# Patient Record
Sex: FEMALE | Race: WHITE | Employment: PART TIME | URBAN - METROPOLITAN AREA
[De-identification: names, ages, dates, MRNs, and addresses within clinical notes are randomized per-mention and may not be internally consistent; named-entity substitution may affect disease eponyms.]

---

## 2017-06-06 ENCOUNTER — TELEPHONE (OUTPATIENT)
Dept: FAMILY MEDICINE | Facility: CLINIC | Age: 52
End: 2017-06-06

## 2017-06-06 NOTE — TELEPHONE ENCOUNTER
Panel Management Review      Patient has the following on her problem list: None      Composite cancer screening  Chart review shows that this patient is due/due soon for the following Mammogram and Colonoscopy  Summary:    Patient is due/failing the following:   COLONOSCOPY and MAMMOGRAM    Action needed:   Patient needs referral/order: colonoscopy and mammogram     Type of outreach:    Sent "[employer]t message.    Questions for provider review:    None                                                                                                                                    db     Chart routed to  .

## 2017-08-05 ENCOUNTER — TELEPHONE (OUTPATIENT)
Dept: INTERNAL MEDICINE | Facility: CLINIC | Age: 52
End: 2017-08-05

## 2017-08-05 NOTE — TELEPHONE ENCOUNTER
8/5/2017    Attempt 1    Contacted patient in regards to scheduling VIP mammogram  Message on voicemail     Patient is also due for - Preventive Health Screening Colonoscopy    Comments:       Outreach   CC

## 2017-08-11 NOTE — TELEPHONE ENCOUNTER
8/10/2017    Attempt 2    Contacted patient in regards to scheduling VIP mammogram  Message on voicemail     Patient is also due for - Preventive Health Screening Colonoscopy    Comments:       Outreach   CC

## 2017-08-17 NOTE — TELEPHONE ENCOUNTER
8/17/2017    Call Regarding VIP MAMMOGRAM    Attempt 3    Message on voicemail     Comments:           Outreach   AT

## 2018-01-15 ENCOUNTER — TELEPHONE (OUTPATIENT)
Dept: FAMILY MEDICINE | Facility: CLINIC | Age: 53
End: 2018-01-15

## 2018-01-15 NOTE — TELEPHONE ENCOUNTER
Panel Management Review      Patient has the following on her problem list: None      Composite cancer screening  Chart review shows that this patient is due/due soon for the following Mammogram and Colonoscopy  Summary:    Patient is due/failing the following:   COLONOSCOPY and MAMMOGRAM    Action needed:   Patient needs referral/order: mammogram and colonoscopy    Type of outreach:    Sent [employer] message.    Questions for provider review:    None                                                                                                                                    db     Chart routed to  .

## 2018-01-21 ENCOUNTER — OFFICE VISIT (OUTPATIENT)
Dept: URGENT CARE | Facility: URGENT CARE | Age: 53
End: 2018-01-21
Payer: COMMERCIAL

## 2018-01-21 VITALS
BODY MASS INDEX: 21.93 KG/M2 | WEIGHT: 140 LBS | TEMPERATURE: 98 F | HEART RATE: 65 BPM | SYSTOLIC BLOOD PRESSURE: 106 MMHG | OXYGEN SATURATION: 98 % | RESPIRATION RATE: 16 BRPM | DIASTOLIC BLOOD PRESSURE: 72 MMHG

## 2018-01-21 DIAGNOSIS — L01.00 IMPETIGO: Primary | ICD-10-CM

## 2018-01-21 PROCEDURE — 99213 OFFICE O/P EST LOW 20 MIN: CPT | Performed by: FAMILY MEDICINE

## 2018-01-21 RX ORDER — MUPIROCIN 20 MG/G
OINTMENT TOPICAL 3 TIMES DAILY
Qty: 22 G | Refills: 1 | Status: SHIPPED | OUTPATIENT
Start: 2018-01-21 | End: 2018-01-28

## 2018-01-21 NOTE — PATIENT INSTRUCTIONS
Understanding Impetigo  Impetigo is a common bacterial infection of the skin. It most often affects the face, arms, and legs. But it can appear on any part of the body. Anyone can have it, regardless of age. But it is most common in children. Impetigo is very contagious. This means it spreads easily to other people.  How to say it  [id number]   What causes impetigo?  Many types of bacteria live on normal, healthy skin. The bacteria usually don t cause problems. Impetigo happens when bacteria enter the skin through a scratch, break, sore, bite, or irritated spot. They then begin to grow out of control, leading to infection. There are two types of staphylococcus bacteria that cause impetigo. In certain cases, impetigo appears on skin that has no visible break. It may be more likely to occur on skin that has another skin problem, such as eczema. It may also be more common after a cold or other virus.  Symptoms of impetigo  Symptoms of this problem include:    Small, fluid-filled blisters on the skin that may itch, ooze, or crust    A yellow, honey-colored crust on the infected skin    Skin sores that spread with scratching    An itchy rash that spreads with scratching    Swollen lymph nodes  Treatment for impetigo  The goal is to treat the infection and prevent it from spreading to others.    You will likely be given an antibiotic to treat the infection. This may be a cream or ointment called muporicin to put on your skin. If the infection is severe or spreading, you may be given antibiotic medicine to take by mouth. Be sure to use this medicine as directed. Do not stop using it until you are told to stop, even if your skin gets better. If you stop too soon, the infection may come back and be harder to treat.    Avoid scratching or picking at your sores. It may help to cover affected areas with a bandage.    To prevent spreading the infection, wash your hands often. Avoid sharing personal items, towels, clothes,  pillows, and sheets with others. After each use, wash these items in hot water.    Clean the affected skin several times a day. Don t scrub. Instead, soak the area in warm, soapy water. This will help remove the crust that forms. For places that you can't soak, such as the face, place a clean, warm (not hot) washcloth on the affected area. Use a new washcloth and towel each time.  When to call your healthcare provider  Call your healthcare provider right away if you have any of these:    Fever of 100.4 F (38 C) or higher, or as directed    Increasing number of sores or spreading areas of redness after 2 days of treatment with antibiotics    Increasing swelling or pain    Increased amounts of fluid or pus coming from the sores    Unusual drowsiness, weakness, or change in behavior    Loss of appetite or vomiting   Date Last Reviewed: 5/1/2016 2000-2017 [employer]. [address] [place] [address], [place] [address]. All rights reserved. This information is not intended as a substitute for professional medical care. Always follow your healthcare professional's instructions.

## 2018-01-21 NOTE — NURSING NOTE
"Chief Complaint   Patient presents with     Urgent Care     Derm Problem     rash on face started yesterday, having burning feeling       Initial /72  Pulse 65  Temp 98  F (36.7  C) (Oral)  Resp 16  Wt 140 lb (63.5 kg)  SpO2 98%  BMI 21.93 kg/m2 Estimated body mass index is 21.93 kg/(m^2) as calculated from the following:    Height as of 9/16/16: 5' 7\" (1.702 m).    Weight as of this encounter: 140 lb (63.5 kg).  Medication Reconciliation: incomplete       Anais Saba CMA      "

## 2018-01-21 NOTE — PROGRESS NOTES
SUBJECTIVE:  Chief Complaint   Patient presents with     Urgent Care     Derm Problem     rash on face started yesterday, having burning feeling       Elba ERIC Jeffries is a 52 year old female who presents to the clinic today for a rash.  Onset of rash was 1 day(s) ago.   Rash is sudden onset and still present.    Location of the rash:   Face / ;eft side of the chin  Quality/symptoms of rash: burning and red   Symptoms are mild and rash seems to be worsening.  Previous history of a similar rash? No  Recent exposure history: none known    Associated symptoms include: nothing.  Patient denies: throat tightness, wheezing, cough, tongue/lip swelling and shortness of breath.    Past Medical History:   Diagnosis Date     Amenorrhea 2008    secondary to endometrial ablation      Menometrorrhagia     resolved s/p endometrial ablation      PMS      PONV (postoperative nausea and vomiting)        ALLERGIES:  Review of patient's allergies indicates no known allergies.      Current Outpatient Prescriptions on File Prior to Visit:  HYDROcodone-acetaminophen (NORCO) 5-325 MG per tablet Take 1-2 tablets by mouth every 4 hours as needed for other (Moderate to Severe Pain)   ZOLOFT 50 MG OR TABS 1 TAB PO QD (Once per day)     No current facility-administered medications on file prior to visit.     Social History   Substance Use Topics     Smoking status: Never Smoker     Smokeless tobacco: Never Used     Alcohol use Yes      Comment: occasional       Family History   Problem Relation Age of Onset     C.A.D. Father      dx late 50s,  70.      Cardiovascular Father      hyperlipidemia     Hypertension Mother      CEREBROVASCULAR DISEASE Mother      TIAs     GASTROINTESTINAL DISEASE Mother      Crohns         ROS:  EYES: NEGATIVE for vision changes or irritation  RESP:NEGATIVE for significant cough or SOB  GI: NEGATIVE for nausea, abdominal pain,      EXAM:   /72  Pulse 65  Temp 98  F (36.7  C) (Oral)  Resp 16  Wt 140 lb  (63.5 kg)  SpO2 98%  BMI 21.93 kg/m2  GENERAL: alert, no acute distress.  SKIN: Rash description:    Distribution: localized  Location: face / left side of the chin  Size  1 cm. X 1 cm.  Color: honey crust and little red,  Lesion type:  Patch,  honey colored crusting ( little)  GENERAL APPEARANCE: healthy, alert and no distress  EYES: EOMI,  PERRL, conjunctiva clear  NEURO: Normal strength and tone, sensory exam grossly normal,  normal speech and mentation    ASSESSMENT:  Impetigo     - mupirocin (BACTROBAN) 2 % ointment; Apply topically 3 times daily for 7 days        Follow-up with primary clinic if not improving with spreading infection

## 2018-01-21 NOTE — MR AVS SNAPSHOT
After Visit Summary   1/21/2018    Elba Jeffries    MRN: [id number]           Patient Information     Date Of Birth          1965        Visit Information        Provider Department      1/21/2018 3:00 PM Caroline Larsen MD [facility] URGENT [facility]ARE        Today's Diagnoses     Impetigo    -  1      Care Instructions      Understanding Impetigo  Impetigo is a common bacterial infection of the skin. It most often affects the face, arms, and legs. But it can appear on any part of the body. Anyone can have it, regardless of age. But it is most common in children. Impetigo is very contagious. This means it spreads easily to other people.  How to say it  [id number]   What causes impetigo?  Many types of bacteria live on normal, healthy skin. The bacteria usually don t cause problems. Impetigo happens when bacteria enter the skin through a scratch, break, sore, bite, or irritated spot. They then begin to grow out of control, leading to infection. There are two types of staphylococcus bacteria that cause impetigo. In certain cases, impetigo appears on skin that has no visible break. It may be more likely to occur on skin that has another skin problem, such as eczema. It may also be more common after a cold or other virus.  Symptoms of impetigo  Symptoms of this problem include:    Small, fluid-filled blisters on the skin that may itch, ooze, or crust    A yellow, honey-colored crust on the infected skin    Skin sores that spread with scratching    An itchy rash that spreads with scratching    Swollen lymph nodes  Treatment for impetigo  The goal is to treat the infection and prevent it from spreading to others.    You will likely be given an antibiotic to treat the infection. This may be a cream or ointment called muporicin to put on your skin. If the infection is severe or spreading, you may be given antibiotic medicine to take by mouth. Be sure to use this medicine as directed. Do not stop  using it until you are told to stop, even if your skin gets better. If you stop too soon, the infection may come back and be harder to treat.    Avoid scratching or picking at your sores. It may help to cover affected areas with a bandage.    To prevent spreading the infection, wash your hands often. Avoid sharing personal items, towels, clothes, pillows, and sheets with others. After each use, wash these items in hot water.    Clean the affected skin several times a day. Don t scrub. Instead, soak the area in warm, soapy water. This will help remove the crust that forms. For places that you can't soak, such as the face, place a clean, warm (not hot) washcloth on the affected area. Use a new washcloth and towel each time.  When to call your healthcare provider  Call your healthcare provider right away if you have any of these:    Fever of 100.4 F (38 C) or higher, or as directed    Increasing number of sores or spreading areas of redness after 2 days of treatment with antibiotics    Increasing swelling or pain    Increased amounts of fluid or pus coming from the sores    Unusual drowsiness, weakness, or change in behavior    Loss of appetite or vomiting   Date Last Reviewed: 5/1/2016 2000-2017 [employer]. [address] [place] [address], [place] [address]. All rights reserved. This information is not intended as a substitute for professional medical care. Always follow your healthcare professional's instructions.                Follow-ups after your visit        Who to contact     If you have questions or need follow up information about today's clinic visit or your schedule please contact [facility] URGENT [facility] directly at [phone].  Normal or non-critical lab and imaging results will be communicated to you by MyChart, letter or phone within 4 business days after the clinic has received the results. If you do not hear from us within 7 days, please contact the clinic through MyChart or phone. If  you have a critical or abnormal lab result, we will notify you by phone as soon as possible.  Submit refill requests through [employer] or call your pharmacy and they will forward the refill request to us. Please allow 3 business days for your refill to be completed.          Additional Information About Your Visit        [employer] Information     [employer] gives you secure access to your electronic health record. If you see a primary care provider, you can also send messages to your care team and make appointments. If you have questions, please call your primary care clinic.  If you do not have a primary care provider, please call [phone] and they will assist you.        Care EveryWhere ID     This is your Care EveryWhere ID. This could be used by other organizations to access your [facility] medical records  [id number]        Your Vitals Were     Pulse Temperature Respirations Pulse Oximetry BMI (Body Mass Index)       65 98  F (36.7  C) (Oral) 16 98% 21.93 kg/m2        Blood Pressure from Last 3 Encounters:   01/21/18 106/72   09/16/16 108/75   09/06/16 103/78    Weight from Last 3 Encounters:   01/21/18 140 lb (63.5 kg)   09/16/16 140 lb 3.2 oz (63.6 kg)   09/06/16 141 lb 1.6 oz (64 kg)              Today, you had the following     No orders found for display         Today's Medication Changes          These changes are accurate as of: 1/21/18  3:11 PM.  If you have any questions, ask your nurse or doctor.               Start taking these medicines.        Dose/Directions    mupirocin 2 % ointment   Commonly known as:  BACTROBAN   Used for:  Impetigo   Started by:  Caroline Larsen MD        Apply topically 3 times daily for 7 days   Quantity:  22 g   Refills:  1            Where to get your medicines      These medications were sent to [facility] [address] [address] [address] [facility] [address] [address] [facility], [facility] [address]     Phone:  [phone]     mupirocin 2 %  ointment                Primary Care Provider Office Phone # Fax #    Abhay Marie [phone]       [address] [place] [phone]        Equal Access to Services     JEREMY HARRIS : Jennyfer maryse mcgee mikaelao Sodenaali, waaxda luqadaha, qaybta kaalmada adeegyada, tacho ivory laChrisjuanjo fine. So [facility] [phone].    ATENCIÓN: Si habla español, tiene a grant disposición servicios gratuitos de asistencia lingüística. Llame al [phone].    We comply with applicable federal civil rights laws and [place] laws. We do not discriminate on the basis of race, color, national origin, age, disability, sex, sexual orientation, or gender identity.            Thank you!     Thank you for choosing [facility] URG[facility]  for your care. Our goal is always to provide you with excellent care. Hearing back from our patients is one way we can continue to improve our services. Please take a few minutes to complete the written survey that you may receive in the mail after your visit with us. Thank you!             Your Updated Medication List - Protect others around you: Learn how to safely use, store and throw away your medicines at www.disposemymeds.org.          This list is accurate as of: 1/21/18  3:11 PM.  Always use your most recent med list.                   Brand Name Dispense Instructions for use Diagnosis    HYDROcodone-acetaminophen 5-325 MG per tablet    NORCO    30 tablet    Take 1-2 tablets by mouth every 4 hours as needed for other (Moderate to Severe Pain)    Female stress incontinence       mupirocin 2 % ointment    BACTROBAN    22 g    Apply topically 3 times daily for 7 days    Impetigo       ZOLOFT 50 MG tablet   Generic drug:  sertraline     30    1 TAB PO QD (Once per day)

## 2018-05-15 ENCOUNTER — HOSPITAL ENCOUNTER (OUTPATIENT)
Dept: MAMMOGRAPHY | Facility: CLINIC | Age: 53
Discharge: HOME | End: 2018-05-15
Attending: OBSTETRICS & GYNECOLOGY | Admitting: OBSTETRICS & GYNECOLOGY
Payer: COMMERCIAL

## 2018-05-15 DIAGNOSIS — Z12.31 VISIT FOR SCREENING MAMMOGRAM: ICD-10-CM

## 2018-05-15 PROCEDURE — 77067 SCR MAMMO BI INCL CAD: CPT

## 2018-07-30 ENCOUNTER — HOSPITAL ENCOUNTER (EMERGENCY)
Facility: CLINIC | Age: 53
Discharge: HOME | End: 2018-07-30
Attending: EMERGENCY MEDICINE | Admitting: EMERGENCY MEDICINE
Payer: COMMERCIAL

## 2018-07-30 ENCOUNTER — APPOINTMENT (OUTPATIENT)
Dept: CT IMAGING | Facility: CLINIC | Age: 53
End: 2018-07-30
Attending: PHYSICIAN ASSISTANT
Payer: COMMERCIAL

## 2018-07-30 VITALS
DIASTOLIC BLOOD PRESSURE: 77 MMHG | HEART RATE: 72 BPM | BODY MASS INDEX: 20.05 KG/M2 | TEMPERATURE: 98 F | WEIGHT: 128 LBS | RESPIRATION RATE: 18 BRPM | OXYGEN SATURATION: 96 % | SYSTOLIC BLOOD PRESSURE: 121 MMHG

## 2018-07-30 DIAGNOSIS — N13.39 OTHER HYDRONEPHROSIS: ICD-10-CM

## 2018-07-30 DIAGNOSIS — N83.201 CYST OF RIGHT OVARY: ICD-10-CM

## 2018-07-30 DIAGNOSIS — N20.1 CALCULUS OF URETER: ICD-10-CM

## 2018-07-30 DIAGNOSIS — K86.2 CYST OF PANCREAS: ICD-10-CM

## 2018-07-30 LAB
ALBUMIN UR-MCNC: NEGATIVE MG/DL
AMORPH CRY #/AREA URNS HPF: ABNORMAL /HPF
ANION GAP SERPL CALCULATED.3IONS-SCNC: 10 MMOL/L (ref 3–14)
APPEARANCE UR: CLEAR
BASOPHILS # BLD AUTO: 0 10E9/L (ref 0–0.2)
BASOPHILS NFR BLD AUTO: 0.7 %
BILIRUB UR QL STRIP: NEGATIVE
BUN SERPL-MCNC: 15 MG/DL (ref 7–30)
CALCIUM SERPL-MCNC: 9.2 MG/DL (ref 8.5–10.1)
CHLORIDE SERPL-SCNC: 104 MMOL/L (ref 94–109)
CO2 SERPL-SCNC: 26 MMOL/L (ref 20–32)
COLOR UR AUTO: YELLOW
CREAT SERPL-MCNC: 0.74 MG/DL (ref 0.52–1.04)
DIFFERENTIAL METHOD BLD: NORMAL
EOSINOPHIL # BLD AUTO: 0.2 10E9/L (ref 0–0.7)
EOSINOPHIL NFR BLD AUTO: 4.2 %
ERYTHROCYTE [DISTWIDTH] IN BLOOD BY AUTOMATED COUNT: 13.2 % (ref 10–15)
GFR SERPL CREATININE-BSD FRML MDRD: 82 ML/MIN/1.7M2
GLUCOSE SERPL-MCNC: 119 MG/DL (ref 70–99)
GLUCOSE UR STRIP-MCNC: NEGATIVE MG/DL
HCT VFR BLD AUTO: 42 % (ref 35–47)
HGB BLD-MCNC: 14 G/DL (ref 11.7–15.7)
HGB UR QL STRIP: ABNORMAL
IMM GRANULOCYTES # BLD: 0 10E9/L (ref 0–0.4)
IMM GRANULOCYTES NFR BLD: 0.2 %
KETONES UR STRIP-MCNC: 5 MG/DL
LEUKOCYTE ESTERASE UR QL STRIP: NEGATIVE
LYMPHOCYTES # BLD AUTO: 1.4 10E9/L (ref 0.8–5.3)
LYMPHOCYTES NFR BLD AUTO: 30.2 %
MCH RBC QN AUTO: 31.7 PG (ref 26.5–33)
MCHC RBC AUTO-ENTMCNC: 33.3 G/DL (ref 31.5–36.5)
MCV RBC AUTO: 95 FL (ref 78–100)
MONOCYTES # BLD AUTO: 0.5 10E9/L (ref 0–1.3)
MONOCYTES NFR BLD AUTO: 10.8 %
MUCOUS THREADS #/AREA URNS LPF: PRESENT /LPF
NEUTROPHILS # BLD AUTO: 2.4 10E9/L (ref 1.6–8.3)
NEUTROPHILS NFR BLD AUTO: 53.9 %
NITRATE UR QL: NEGATIVE
NRBC # BLD AUTO: 0 10*3/UL
NRBC BLD AUTO-RTO: 0 /100
PH UR STRIP: 8 PH (ref 5–7)
PLATELET # BLD AUTO: 241 10E9/L (ref 150–450)
POTASSIUM SERPL-SCNC: 3.6 MMOL/L (ref 3.4–5.3)
RBC # BLD AUTO: 4.42 10E12/L (ref 3.8–5.2)
RBC #/AREA URNS AUTO: 2 /HPF (ref 0–2)
SODIUM SERPL-SCNC: 140 MMOL/L (ref 133–144)
SOURCE: ABNORMAL
SP GR UR STRIP: 1.02 (ref 1–1.03)
UROBILINOGEN UR STRIP-MCNC: 0 MG/DL (ref 0–2)
WBC # BLD AUTO: 4.5 10E9/L (ref 4–11)
WBC #/AREA URNS AUTO: 2 /HPF (ref 0–5)

## 2018-07-30 PROCEDURE — 74177 CT ABD & PELVIS W/CONTRAST: CPT

## 2018-07-30 PROCEDURE — 96361 HYDRATE IV INFUSION ADD-ON: CPT

## 2018-07-30 PROCEDURE — 96376 TX/PRO/DX INJ SAME DRUG ADON: CPT

## 2018-07-30 PROCEDURE — 96375 TX/PRO/DX INJ NEW DRUG ADDON: CPT

## 2018-07-30 PROCEDURE — 96374 THER/PROPH/DIAG INJ IV PUSH: CPT | Mod: 59

## 2018-07-30 PROCEDURE — 81001 URINALYSIS AUTO W/SCOPE: CPT | Performed by: PHYSICIAN ASSISTANT

## 2018-07-30 PROCEDURE — 25000128 H RX IP 250 OP 636: Performed by: EMERGENCY MEDICINE

## 2018-07-30 PROCEDURE — 85025 COMPLETE CBC W/AUTO DIFF WBC: CPT | Performed by: PHYSICIAN ASSISTANT

## 2018-07-30 PROCEDURE — 80048 BASIC METABOLIC PNL TOTAL CA: CPT | Performed by: PHYSICIAN ASSISTANT

## 2018-07-30 PROCEDURE — 25000128 H RX IP 250 OP 636: Performed by: PHYSICIAN ASSISTANT

## 2018-07-30 PROCEDURE — 99285 EMERGENCY DEPT VISIT HI MDM: CPT | Mod: 25

## 2018-07-30 RX ORDER — IOPAMIDOL 755 MG/ML
500 INJECTION, SOLUTION INTRAVASCULAR ONCE
Status: COMPLETED | OUTPATIENT
Start: 2018-07-30 | End: 2018-07-30

## 2018-07-30 RX ORDER — OXYCODONE AND ACETAMINOPHEN 5; 325 MG/1; MG/1
1-2 TABLET ORAL EVERY 4 HOURS PRN
Qty: 12 TABLET | Refills: 0 | Status: SHIPPED | OUTPATIENT
Start: 2018-07-30 | End: 2018-10-10

## 2018-07-30 RX ORDER — SODIUM CHLORIDE 9 MG/ML
1000 INJECTION, SOLUTION INTRAVENOUS CONTINUOUS
Status: DISCONTINUED | OUTPATIENT
Start: 2018-07-30 | End: 2018-07-30 | Stop reason: HOSPADM

## 2018-07-30 RX ORDER — HYDROMORPHONE HYDROCHLORIDE 1 MG/ML
0.5 INJECTION, SOLUTION INTRAMUSCULAR; INTRAVENOUS; SUBCUTANEOUS ONCE
Status: COMPLETED | OUTPATIENT
Start: 2018-07-30 | End: 2018-07-30

## 2018-07-30 RX ORDER — TAMSULOSIN HYDROCHLORIDE 0.4 MG/1
0.4 CAPSULE ORAL DAILY
Qty: 7 CAPSULE | Refills: 0 | Status: SHIPPED | OUTPATIENT
Start: 2018-07-30 | End: 2018-08-06

## 2018-07-30 RX ORDER — HYDROMORPHONE HYDROCHLORIDE 1 MG/ML
0.5 INJECTION, SOLUTION INTRAMUSCULAR; INTRAVENOUS; SUBCUTANEOUS
Status: DISCONTINUED | OUTPATIENT
Start: 2018-07-30 | End: 2018-07-30 | Stop reason: HOSPADM

## 2018-07-30 RX ORDER — ONDANSETRON 4 MG/1
4 TABLET, ORALLY DISINTEGRATING ORAL EVERY 6 HOURS PRN
Qty: 20 TABLET | Refills: 0 | Status: ON HOLD | OUTPATIENT
Start: 2018-07-30 | End: 2018-10-23

## 2018-07-30 RX ORDER — ONDANSETRON 2 MG/ML
4 INJECTION INTRAMUSCULAR; INTRAVENOUS EVERY 30 MIN PRN
Status: DISCONTINUED | OUTPATIENT
Start: 2018-07-30 | End: 2018-07-30 | Stop reason: HOSPADM

## 2018-07-30 RX ORDER — KETOROLAC TROMETHAMINE 15 MG/ML
15 INJECTION, SOLUTION INTRAMUSCULAR; INTRAVENOUS ONCE
Status: COMPLETED | OUTPATIENT
Start: 2018-07-30 | End: 2018-07-30

## 2018-07-30 RX ADMIN — KETOROLAC TROMETHAMINE 15 MG: 15 INJECTION, SOLUTION INTRAMUSCULAR; INTRAVENOUS at 10:36

## 2018-07-30 RX ADMIN — SODIUM CHLORIDE 1000 ML: 9 INJECTION, SOLUTION INTRAVENOUS at 09:26

## 2018-07-30 RX ADMIN — ONDANSETRON 4 MG: 2 INJECTION, SOLUTION INTRAMUSCULAR; INTRAVENOUS at 09:26

## 2018-07-30 RX ADMIN — SODIUM CHLORIDE 56 ML: 900 INJECTION, SOLUTION INTRAVENOUS at 10:05

## 2018-07-30 RX ADMIN — Medication 0.5 MG: at 10:17

## 2018-07-30 RX ADMIN — Medication 0.5 MG: at 09:26

## 2018-07-30 RX ADMIN — IOPAMIDOL 64 ML: 755 INJECTION, SOLUTION INTRAVENOUS at 10:05

## 2018-07-30 ASSESSMENT — ENCOUNTER SYMPTOMS
CONSTIPATION: 0
ABDOMINAL PAIN: 1
VOMITING: 0
NAUSEA: 1
FLANK PAIN: 0
DYSURIA: 0
FEVER: 0
DIARRHEA: 1
RECTAL PAIN: 0
BLOOD IN STOOL: 0
HEMATURIA: 0

## 2018-07-30 NOTE — ED TRIAGE NOTES
Pt presents to ED with c/o RLQ abdominal pain that wraps into her back. Came on suddenly, waking her from sleep this morning around 630. Denies having pain like this before. Reports nausea, diarrhea and lightheadedness. Denies bloody/dark stools.

## 2018-07-30 NOTE — ED PROVIDER NOTES
History     Chief Complaint:  Abdominal Pain    HPI   Elba Jeffries is a 52 year old postmenopausal female with history of urinary stress incontinence and bladder mesh, who presents with right lower quadrant abdominal pain which began when she woke up this morning.  Patient states that approximately 3 hours prior she was awakened from sleep with severe right lower quadrant abdominal pain.  Also reports that she has been nauseous and has had several episodes of diarrhea, but denies vomiting, melena, hematochezia.  She does have a distant history of kidney stones in the past, though reports that this feels somewhat different and she denies any dysuria or hematuria.  No vaginal bleeding.  She has not taken anything for her symptoms yet.  No other prior abdominal surgeries.  Denies recent antibiotic use or travel outside the [place].    Allergies:  No known drug allergies    Medications:    Excedrin  Zoloft    Past Medical History:    Amenorrhea  Menometrorrhagia  PMS  Postoperative nausea and vomiting    Past Surgical History:    Cystoscopy, sling transvaginal  Sinus surgery  Hysterectomy, surgical with endometrial ablation     Family History:    family history includes C.A.D. in her father; Cardiovascular in her father; Cerebrovascular Disease in her mother; GASTROINTESTINAL DISEASE in her mother; Hypertension in her mother.  Social History:   reports that she has never smoked. She has never used smokeless tobacco. She reports that she drinks alcohol. She reports that she does not use illicit drugs.    PCP: Abhay Marie     Review of Systems   Constitutional: Negative for fever.   Gastrointestinal: Positive for abdominal pain, diarrhea and nausea. Negative for blood in stool, constipation, rectal pain and vomiting.   Genitourinary: Negative for dysuria, flank pain, hematuria and vaginal bleeding.   All other systems reviewed and are negative.    Physical Exam     Patient Vitals for the past 24 hrs:   BP  Temp Pulse Heart Rate Resp SpO2 Weight   07/30/18 1130 121/77 - - - - 96 % -   07/30/18 1115 121/75 - - - - 97 % -   07/30/18 1100 126/79 - - - - - -   07/30/18 1045 127/78 - - - - 100 % -   07/30/18 1030 125/82 - - - - 100 % -   07/30/18 0945 127/73 - - - - 100 % -   07/30/18 0930 133/79 - - - - 100 % -   07/30/18 0915 131/71 - - - - 98 % -   07/30/18 0905 134/86 98  F (36.7  C) 72 72 18 98 % 58.1 kg (128 lb)      Physical Exam   General: Alert and cooperative with exam.  Patient uncomfortable appearing.  Normal mentation.  Head:  Scalp is NC/AT  Eyes:  No scleral icterus, PERRL  ENT:  The external nose and ears are normal.  CV:  Regular rate and rhythm    No pathologic murmur, rubs, or gallops.  Resp:  Breath sounds are clear bilaterally.  No crackles, wheezes, rhonchi.    Non-labored, no retractions or accessory muscle use  GI:  Abdomen is soft, no distension, tenderness to palpation in right lower quadrant.  No other significant tenderness to palpation.  Bowel sounds present in all quadrants.. No peritoneal signs.  :  No suprapubic or flank tenderness  MS:  No lower extremity edema   Skin:  Warm and dry, No rash or lesions noted.  Neuro: Oriented x 3. No gross motor deficits.    Emergency Department Course     Imaging:  CT Abdomen Pelvis w Contrast   Preliminary Result   IMPRESSION:    1. Severe right hydronephrosis and hydroureter ureter. 0.3 cm   calcification consistent with right ureterovesical junction stone.   There is no right perinephric stranding.   2. 0.2 cm nonobstructing mid right kidney stone.   3. 3.5 cm right ovarian cyst.   4. Mild pancreatic duct dilatation and nonspecific 0.5 x 0.9 cm cystic   lesion in the uncinate process of the pancreas. Cause of the duct   dilatation and cystic pancreatic lesion are not identified on this   exam but differential diagnosis includes cystic pancreatic neoplasm or   early IPMN. Comparison with any previous outside CT or upper abdominal   imaging is  recommended as well as follow-up with pancreas MRI.           Laboratory:  Labs Ordered and Resulted from Time of ED Arrival Up to the Time of Departure from the ED   BASIC METABOLIC PANEL - Abnormal; Notable for the following:        Result Value    Glucose 119 (*)     All other components within normal limits   ROUTINE UA WITH MICROSCOPIC - Abnormal; Notable for the following:     Ketones Urine 5 (*)     Blood Urine Small (*)     pH Urine 8.0 (*)     Mucous Urine Present (*)     Amorphous Crystals Few (*)     All other components within normal limits   CBC WITH PLATELETS DIFFERENTIAL   FREE WATER   Results otherwise within normal limits.  All results discussed with patient and questions answered.    Interventions:  0926: Zofran 4 mg IV  0926: NS 1L IV Bolus  0926: Dilaudid 0.5 IV  1017: Dilaudid 0.5 IV  1036: Toradol 15 mg IV     Emergency Department Course:  Past medical records, nursing notes, and vitals reviewed.  I performed an exam of the patient and obtained history, as documented above.  I rechecked the patient. Findings and plan explained to the Patient and . Patient was discharged to home.    Impression & Plan      Medical Decision Making:  Elba Jeffries is a 52 year old female who presents with right lower quadrant abdominal pain and nausea which began this morning.  Patient history and records reviewed.  Broad differential was considered.  On examination, the patient is vitally stable and has tenderness to palpation in her right lower quadrant.  Lab work was obtained as above and unremarkable.  CT scan of abdomen and pelvis revealed 3 mm right ureterovesicular stone with accompanying severe right hydronephrosis.  Urinalysis consistent with blood, but no signs of infection patient is afebrile without flank pain, and with normal white count.  She was given symptomatic treatment as above with significant improvement in symptoms.  Spoke with Estrella Ceron PA-C of urology, who states that the  patient's hydronephrosis and an of itself is not a reason for admission, and since her pain is well controlled, her kidney function is normal, and she has no signs of infection believe she is safe for discharge home at this time.  She was discharged with Flomax, strainer, and supportive medications as below with instruction to follow-up with urology this week.  Discussed indications to return to ED if new or worsening symptoms, or if she develops fever or severe flank pain.    Additionally, other incidental findings on the CT scan included a pancreatic cyst, and a cyst of the right ovary.  Discussed that the pancreatic cyst requires follow-up MRI to exclude malignancy, and patient will follow up with her primary care provider this week to schedule this.  She was also given contact information for gastroenterology.  The patient was given gynecology follow-up regarding her right ovarian cyst.    Diagnosis:    ICD-10-CM   1. Calculus of ureter N20.1   2. Cyst of right ovary N83.201   3. Cyst of pancreas K86.2   4. Other hydronephrosis N13.39        Discharge Medications:   Details   ondansetron (ZOFRAN ODT) 4 MG ODT tab Take 1 tablet (4 mg) by mouth every 6 hours as needed for nausea, Disp-20 tablet, R-0, Local Print      oxyCODONE-acetaminophen (PERCOCET) 5-325 MG per tablet Take 1-2 tablets by mouth every 4 hours as needed for pain, Disp-12 tablet, R-0, Local Print      tamsulosin (FLOMAX) 0.4 MG capsule Take 1 capsule (0.4 mg) by mouth daily for 7 days, Disp-7 capsule, R-0, Local Print        7/30/2018   Liam Wade PA-C, PA-C  07/30/18 124

## 2018-07-30 NOTE — DISCHARGE INSTRUCTIONS
Follow-up with your primary care provider to schedule an MRI to better visualize the cyst mass that was seen on your pancreas.  The cause of this is uncertain, but further imaging is required to rule out a malignancy.  Follow-up with gynecology regarding your ovarian cyst.    Discharge Instructions  Kidney Stones    Kidney stones are a common problem that can cause a lot of pain but fortunately are usually not dangerous. Kidney stones form in the kidney and then can cause a blockage (obstruction) of the flow of urine from the kidney which leads to pain. Most patients can manage kidney stones at home (without a hospital stay).  However, sometimes your condition may be worse than it seemed at first, or may get worse with time. Most kidney stones will pass on their own, but occasionally stones may need to be removed by an urologist.    Generally, every Emergency Department visit should have a follow-up clinic visit with either a primary or a specialty clinic/provider. Please follow-up as instructed by your emergency provider today.      Return to the Emergency Department if:    Your pain is not controlled despite the medications provided or recommended.    You are vomiting (throwing up) and cannot keep fluids or medications down.    You develop a fever (>100.4 F).    You feel much more ill or develop new symptoms.  What can I do to help myself?    Be sure to drink plenty of fluids.    If instructed to do so, strain your urine (pee) with the urine strainer you were provided with today. Your stone may look like a grain of sand or a small pebble. Collect any stones in the cup provided and bring to your follow-up appointment.    Staying active is good, and may help the stone to pass. You may do whatever you feel up to doing without restrictions.   Treatment:    Non-steroidal anti-inflammatory drugs (NSAIDs). This includes prescription medicines like Toradol  (ketorolac) and non-prescription medicines like Advil   (ibuprofen) and Nuprin  (ibuprofen) and Naproxen. These pain relievers are very effective for kidney stones.    Nausea (sick to your stomach) medication.  Nausea and vomiting are common with kidney stones, so your provider may send you home with medicine for this.     Flomax  (tamsulosin). This medicine is sometimes used for men with prostate problems, but also can help kidney stones to pass. Its effectiveness is controversial or questionable so it is prescribed in certain situations. This medicine can lower blood pressure, and you may feel faint/lightheaded, especially when you first stand up. Be sure to get up gradually, sit down if you feel faint, and avoid activity where feeling faint would be dangerous, such as climbing ladders.  If you were given a prescription for medicine here today, be sure to read all of the information (including the package insert) that comes with your prescription.  This will include important information about the medicine, its side effects, and any warnings that you need to know about.  The pharmacist who fills the prescription can provide more information and answer questions you may have about the medicine.  If you have questions or concerns that the pharmacist cannot address, please call or return to the Emergency Department.   Remember that you can always come back to the Emergency Department if you are not able to see your regular provider in the amount of time listed above, if you get any new symptoms, or if there is anything that worries you.

## 2018-07-30 NOTE — ED AVS SNAPSHOT
[facility] Emergency Department    [address]    [facility] [address]    Phone:  [phone]    Fax:  [phone]                                       Elba Jeffries   MRN: [id number]    Department:  [facility] Emergency Department   Date of Visit:  7/30/2018           Patient Information     Date Of Birth          1965        Your diagnoses for this visit were:     Calculus of ureter     Cyst of right ovary     Cyst of pancreas     Other hydronephrosis        You were seen by Angélica Freeman MD.      Follow-up Information     Follow up with [facility] Emergency Department.    Specialty:  EMERGENCY MEDICINE    Why:  As needed, If symptoms worsen    Contact information:    [address]  [facility] [address] [phone]        Follow up with Abhay Marie MD.    Specialty:  Internal Medicine    Why:  To discuss MRI of pancreas    Contact information:    [address] [facility] [address] [phone]          Follow up with Estrella Ceron PA-C. Call today.    Specialty:  Physician Assistant    Contact information:    [facility]  [address]  [place] [address]  [phone]          Follow up with [facility] In 3 days.    Specialty:  Gynecology    Contact information:    [address]  [place] [address]          Discharge Instructions       Follow-up with your primary care provider to schedule an MRI to better visualize the cyst mass that was seen on your pancreas.  The cause of this is uncertain, but further imaging is required to rule out a malignancy.  Follow-up with gynecology regarding your ovarian cyst.    Discharge Instructions  Kidney Stones    Kidney stones are a common problem that can cause a lot of pain but fortunately are usually not dangerous. Kidney stones form in the kidney and then can cause a blockage (obstruction) of the flow of urine from the kidney  which leads to pain. Most patients can manage kidney stones at home (without a hospital stay).  However, sometimes your condition may be worse than it seemed at first, or may get worse with time. Most kidney stones will pass on their own, but occasionally stones may need to be removed by an urologist.    Generally, every Emergency Department visit should have a follow-up clinic visit with either a primary or a specialty clinic/provider. Please follow-up as instructed by your emergency provider today.      Return to the Emergency Department if:    Your pain is not controlled despite the medications provided or recommended.    You are vomiting (throwing up) and cannot keep fluids or medications down.    You develop a fever (>100.4 F).    You feel much more ill or develop new symptoms.  What can I do to help myself?    Be sure to drink plenty of fluids.    If instructed to do so, strain your urine (pee) with the urine strainer you were provided with today. Your stone may look like a grain of sand or a small pebble. Collect any stones in the cup provided and bring to your follow-up appointment.    Staying active is good, and may help the stone to pass. You may do whatever you feel up to doing without restrictions.   Treatment:    Non-steroidal anti-inflammatory drugs (NSAIDs). This includes prescription medicines like Toradol  (ketorolac) and non-prescription medicines like Advil  (ibuprofen) and Nuprin  (ibuprofen) and Naproxen. These pain relievers are very effective for kidney stones.    Nausea (sick to your stomach) medication.  Nausea and vomiting are common with kidney stones, so your provider may send you home with medicine for this.     Flomax  (tamsulosin). This medicine is sometimes used for men with prostate problems, but also can help kidney stones to pass. Its effectiveness is controversial or questionable so it is prescribed in certain situations. This medicine can lower blood pressure, and you may feel  faint/lightheaded, especially when you first stand up. Be sure to get up gradually, sit down if you feel faint, and avoid activity where feeling faint would be dangerous, such as climbing ladders.  If you were given a prescription for medicine here today, be sure to read all of the information (including the package insert) that comes with your prescription.  This will include important information about the medicine, its side effects, and any warnings that you need to know about.  The pharmacist who fills the prescription can provide more information and answer questions you may have about the medicine.  If you have questions or concerns that the pharmacist cannot address, please call or return to the Emergency Department.   Remember that you can always come back to the Emergency Department if you are not able to see your regular provider in the amount of time listed above, if you get any new symptoms, or if there is anything that worries you.      24 Hour Appointment Hotline       To make an appointment at any [facility], call [phone] ([phone]). If you don't have a family doctor or clinic, we will help you find one. [place] clinics are conveniently located to serve the needs of you and your family.             Review of your medicines      START taking        Dose / Directions Last dose taken    ondansetron 4 MG ODT tab   Commonly known as:  ZOFRAN ODT   Dose:  4 mg   Quantity:  20 tablet        Take 1 tablet (4 mg) by mouth every 6 hours as needed for nausea   Refills:  0        oxyCODONE-acetaminophen 5-325 MG per tablet   Commonly known as:  PERCOCET   Dose:  1-2 tablet   Quantity:  12 tablet        Take 1-2 tablets by mouth every 4 hours as needed for pain   Refills:  0        tamsulosin 0.4 MG capsule   Commonly known as:  FLOMAX   Dose:  0.4 mg   Quantity:  7 capsule        Take 1 capsule (0.4 mg) by mouth daily for 7 days   Refills:  0          Our records show that you are taking the  medicines listed below. If these are incorrect, please call your family doctor or clinic.        Dose / Directions Last dose taken    aspirin-acetaminophen-caffeine 250-250-65 MG per tablet   Commonly known as:  EXCEDRIN MIGRAINE   Dose:  1 tablet        Take 1 tablet by mouth every 6 hours as needed for headaches   Refills:  0        ZOLOFT 50 MG tablet   Quantity:  30   Generic drug:  sertraline        1 TAB PO QD (Once per day)   Refills:  1                Information about OPIOIDS     PRESCRIPTION OPIOIDS: WHAT YOU NEED TO KNOW   We gave you an opioid (narcotic) pain medicine. It is important to manage your pain, but opioids are not always the best choice. You should first try all the other options your care team gave you. Take this medicine for as short a time (and as few doses) as possible.     These medicines have risks:    DO NOT drive when on new or higher doses of pain medicine. These medicines can affect your alertness and reaction times, and you could be arrested for driving under the influence (DUI). If you need to use opioids long-term, talk to your care team about driving.    DO NOT operate heave machinery    DO NOT do any other dangerous activities while taking these medicines.     DO NOT drink any alcohol while taking these medicines.      If the opioid prescribed includes acetaminophen, DO NOT take with any other medicines that contain acetaminophen. Read all labels carefully. Look for the word  acetaminophen  or  Tylenol.  Ask your pharmacist if you have questions or are unsure.    You can get addicted to pain medicines, especially if you have a history of addiction (chemical, alcohol or substance dependence). Talk to your care team about ways to reduce this risk.    Store your pills in a secure place, locked if possible. We will not replace any lost or stolen medicine. If you don t finish your medicine, please throw away (dispose) as directed by your pharmacist. The [place] [employer] has more information about safe disposal: https://www.pca.[facility].[place].us/living-green/managing-unwanted-medications.     All opioids tend to cause constipation. Drink plenty of water and eat foods that have a lot of fiber, such as fruits, vegetables, prune juice, apple juice and high-fiber cereal. Take a laxative (Miralax, milk of magnesia, Colace, Senna) if you don t move your bowels at least every other day.         Prescriptions were sent or printed at these locations (3 Prescriptions)                   Other Prescriptions                Printed at Department/Unit printer (3 of 3)         ondansetron (ZOFRAN ODT) 4 MG ODT tab               oxyCODONE-acetaminophen (PERCOCET) 5-325 MG per tablet               tamsulosin (FLOMAX) 0.4 MG capsule                Procedures and tests performed during your visit     Basic metabolic panel    CBC with platelets differential    CT Abdomen Pelvis w Contrast    Give 20 ounces of water 15 minutes before CT of abdomen    UA with Microscopic      Orders Needing Specimen Collection     None      Pending Results     Date and Time Order Name Status Description    7/30/2018 0917 CT Abdomen Pelvis w Contrast Preliminary             Pending Culture Results     No orders found from 7/28/2018 to 7/31/2018.            Pending Results Instructions     If you had any lab results that were not finalized at the time of your Discharge, you can call the ED Lab Result RN at [phone]. You will be contacted by this team for any positive Lab results or changes in treatment. The nurses are available 7 days a week from 10A to 6:30P.  You can leave a message 24 hours per day and they will return your call.        Test Results From Your Hospital Stay        7/30/2018  9:31 AM      Component Results     Component Value Ref Range & Units Status    WBC 4.5 4.0 - 11.0 10e9/L Final    RBC Count 4.42 3.8 - 5.2 10e12/L Final    Hemoglobin 14.0 11.7 - 15.7 g/dL Final    Hematocrit 42.0 35.0 - 47.0 %  Final    MCV 95 78 - 100 fl Final    MCH 31.7 26.5 - 33.0 pg Final    MCHC 33.3 31.5 - 36.5 g/dL Final    RDW 13.2 10.0 - 15.0 % Final    Platelet Count 241 150 - 450 10e9/L Final    Diff Method Automated Method  Final    % Neutrophils 53.9 % Final    % Lymphocytes 30.2 % Final    % Monocytes 10.8 % Final    % Eosinophils 4.2 % Final    % Basophils 0.7 % Final    % Immature Granulocytes 0.2 % Final    Nucleated RBCs 0 0 /100 Final    Absolute Neutrophil 2.4 1.6 - 8.3 10e9/L Final    Absolute Lymphocytes 1.4 0.8 - 5.3 10e9/L Final    Absolute Monocytes 0.5 0.0 - 1.3 10e9/L Final    Absolute Eosinophils 0.2 0.0 - 0.7 10e9/L Final    Absolute Basophils 0.0 0.0 - 0.2 10e9/L Final    Abs Immature Granulocytes 0.0 0 - 0.4 10e9/L Final    Absolute Nucleated RBC 0.0  Final         7/30/2018  9:50 AM      Component Results     Component Value Ref Range & Units Status    Sodium 140 133 - 144 mmol/L Final    Potassium 3.6 3.4 - 5.3 mmol/L Final    Chloride 104 94 - 109 mmol/L Final    Carbon Dioxide 26 20 - 32 mmol/L Final    Anion Gap 10 3 - 14 mmol/L Final    Glucose 119 (H) 70 - 99 mg/dL Final    Urea Nitrogen 15 7 - 30 mg/dL Final    Creatinine 0.74 0.52 - 1.04 mg/dL Final    GFR Estimate 82 >60 mL/min/1.7m2 Final    Non  GFR Calc    GFR Estimate If Black >90 >60 mL/min/1.7m2 Final    African American GFR Calc    Calcium 9.2 8.5 - 10.1 mg/dL Final         7/30/2018 11:25 AM      Component Results     Component Value Ref Range & Units Status    Color Urine Yellow  Final    Appearance Urine Clear  Final    Glucose Urine Negative NEG^Negative mg/dL Final    Bilirubin Urine Negative NEG^Negative Final    Ketones Urine 5 (A) NEG^Negative mg/dL Final    Specific Gravity Urine 1.023 1.003 - 1.035 Final    Blood Urine Small (A) NEG^Negative Final    pH Urine 8.0 (H) 5.0 - 7.0 pH Final    Protein Albumin Urine Negative NEG^Negative mg/dL Final    Urobilinogen mg/dL 0.0 0.0 - 2.0 mg/dL Final    Nitrite Urine Negative  NEG^Negative Final    Leukocyte Esterase Urine Negative NEG^Negative Final    Source Midstream Urine  Final    WBC Urine 2 0 - 5 /HPF Final    RBC Urine 2 0 - 2 /HPF Final    Mucous Urine Present (A) NEG^Negative /LPF Final    Amorphous Crystals Few (A) NEG^Negative /HPF Final         7/30/2018 10:29 AM      Narrative     CT ABDOMEN/PELVIS WITH CONTRAST July 30, 2018 10:14 AM    HISTORY: Right lower quadrant pain began this morning.     TECHNIQUE: Images from diaphragm to pubic symphysis 64mL Isovue-370 IV  contrast. Radiation dose for this scan was reduced using automated  exposure control, adjustment of the mA and/or kV according to patient  size, or iterative reconstruction technique.    COMPARISON: None.    FINDINGS:   Abdomen and Pelvis: Severe right hydronephrosis and hydroureter. On  coronal image 72; 0.2 cm nonobstructing right kidney stone. On series  2 image 63 there is a 0.3 cm calcification along the posterior right  bladder consistent with a right ureteral vesicle junction stone.    3.5 cm right ovarian cyst. Minimal atelectasis posterior left lung  base. Subcentimeter focus of increased attenuation at the dome of the  liver series 2 image 4 suggests a tiny indeterminate enhancing nodule.  Normal-appearing spleen, adrenal glands and left kidney. No periaortic  aortic or pelvic adenopathy. No free fluid or acute appearing bowel  abnormality.    The pancreas demonstrates mild pancreatic duct dilatation in the  pancreatic body and tail. On coronal series 3 image 50 there is a 0.5  x 0.9 cm cystic-appearing lesion in the uncinate process of the  pancreas.         Impression     IMPRESSION:   1. Severe right hydronephrosis and hydroureter ureter. 0.3 cm  calcification consistent with right ureterovesical junction stone.  There is no right perinephric stranding.  2. 0.2 cm nonobstructing mid right kidney stone.  3. 3.5 cm right ovarian cyst.  4. Mild pancreatic duct dilatation and nonspecific 0.5 x 0.9 cm  cystic  lesion in the uncinate process of the pancreas. Cause of the duct  dilatation and cystic pancreatic lesion are not identified on this  exam but differential diagnosis includes cystic pancreatic neoplasm or  early IPMN. Comparison with any previous outside CT or upper abdominal  imaging is recommended as well as follow-up with pancreas MRI.                Clinical Quality Measure: Blood Pressure Screening     Your blood pressure was checked while you were in the emergency department today. The last reading we obtained was  BP: 121/77 . Please read the guidelines below about what these numbers mean and what you should do about them.  If your systolic blood pressure (the top number) is less than 120 and your diastolic blood pressure (the bottom number) is less than 80, then your blood pressure is normal. There is nothing more that you need to do about it.  If your systolic blood pressure (the top number) is 120-139 or your diastolic blood pressure (the bottom number) is 80-89, your blood pressure may be higher than it should be. You should have your blood pressure rechecked within a year by a primary care provider.  If your systolic blood pressure (the top number) is 140 or greater or your diastolic blood pressure (the bottom number) is 90 or greater, you may have high blood pressure. High blood pressure is treatable, but if left untreated over time it can put you at risk for heart attack, stroke, or kidney failure. You should have your blood pressure rechecked by a primary care provider within the next 4 weeks.  If your provider in the emergency department today gave you specific instructions to follow-up with your doctor or provider even sooner than that, you should follow that instruction and not wait for up to 4 weeks for your follow-up visit.        Thank you for choosing [facility]       Thank you for choosing [facility] for your care. Our goal is always to provide you with excellent care. Hearing back from  our patients is one way we can continue to improve our services. Please take a few minutes to complete the written survey that you may receive in the mail after you visit with us. Thank you!        [employer] Information     [employer] gives you secure access to your electronic health record. If you see a primary care provider, you can also send messages to your care team and make appointments. If you have questions, please call your primary care clinic.  If you do not have a primary care provider, please call [phone] and they will assist you.        Care EveryWhere ID     This is your Care EveryWhere ID. This could be used by other organizations to access your [facility] medical records  [id number]        Equal Access to Services     JEREMY HARRIS : Jennyfer Hopper, paula acosta, anish harper, tacho fine. So [facility] [phone].    ATENCIÓN: Si habla español, tiene a grant disposición servicios gratuitos de asistencia lingüística. Llame al [phone].    We comply with applicable federal civil rights laws and [place] laws. We do not discriminate on the basis of race, color, national origin, age, disability, sex, sexual orientation, or gender identity.            After Visit Summary       This is your record. Keep this with you and show to your community pharmacist(s) and doctor(s) at your next visit.

## 2018-07-30 NOTE — ED AVS SNAPSHOT
[facility] Emergency Department    [address]    [facility] [address]    Phone:  [phone]    Fax:  [phone]                                       Elba Jeffries   MRN: [id number]    Department:  [facility] Emergency Department   Date of Visit:  7/30/2018           After Visit Summary Signature Page     I have received my discharge instructions, and my questions have been answered. I have discussed any challenges I see with this plan with the nurse or doctor.    ..........................................................................................................................................  Patient/Patient Representative Signature      ..........................................................................................................................................  Patient Representative Print Name and Relationship to Patient    ..................................................               ................................................  Date                                            Time    ..........................................................................................................................................  Reviewed by Signature/Title    ...................................................              ..............................................  Date                                                            Time

## 2018-07-30 NOTE — ED PROVIDER NOTES
Emergency Department Attending Supervision Note  7/30/2018  9:11 AM      I evaluated this patient in conjunction with Liam Sykes PA-C      Briefly, the patient presented with abdominal pain. The patient reports that this morning she developed right lower quadrant abdominal pain that is accompanied by lightheadedness, nausea and diarrhea. She states that she has a history of kidney stones, but this pain feels completely different. She denies having vomiting or dysuria.    On my exam,   General: Patient is alert and interactive when I enter the room, uncomfortable appearing  Head:  The scalp, face, and head appear normal  Eyes:  Conjunctivae are normal  ENT:    The nose is normal    Pinnae are normal    External acoustic canals are normal  Neck:  Trachea midline  CV:  Pulses are normal    Resp:  No respiratory distress   Abdomen:      Soft, moderate right lower quadrant tenderness, non-distended  Musc:  Normal muscular tone    No major joint effusions    No asymmetric leg swelling    Good capillary refill noted  Skin:  No rash or lesions noted  Neuro:  Speech is normal and fluent. Face is symmetric.     Moving all extremities well.   Psych: Awake. Alert.  Normal affect.  Appropriate interactions.    Results:    CT Abdomen Pelvis w Contrast:  1. Severe right hydronephrosis and hydroureter ureter. 0.3 cm calcification consistent with right ureterovesical junction stone. There is no right perinephric stranding.  2. 0.2 cm nonobstructing mid right kidney stone.  3. 3.5 cm right ovarian cyst.  4. Mild pancreatic duct dilatation and nonspecific 0.5 x 0.9 cm cystic lesion in the uncinate process of the pancreas. Cause of the duct dilatation and cystic pancreatic lesion are not identified on this exam but differential diagnosis includes cystic pancreatic neoplasm or early IPMN. Comparison with any previous outside CT or upper abdominal imaging is recommended as well as follow-up with pancreas MRI.  As read by  Radiology.    CBC: WNL (WBC 4.5, HGB 14.0, )  BMP: Glucose 119 (H), o/w WNL (Creatinine 0.74)  UA: Urineketon 5, Blood-- Blood, pH 8.0 (H), Mucous Present, Amorphous Crystals-- Few, o/w Negative    ED course:  Past medical records, nursing notes, and vitals reviewed.  0919: I performed an exam of the patient and obtained history, as documented above.   IV inserted and blood drawn.  The patient was sent for an abdomen and pelvis CT while in the emergency department, findings above.    Findings and plan explained to the patient. Patient discharged home with instructions regarding supportive care, medications, and reasons to return. The importance of close follow-up was reviewed.     MDM:      Elba Jeffries is a 52-year-old female presents with right-sided pain.  Vitals were unremarkable.  Exam revealed significant right lower tenderness.  Concerning for appendicitis versus kidney stone.  CT was done which revealed severe hydronephrosis and a 0.3 cm stone in the right UPJ junction.  She also has pancreatic duct dilatation which could be a neoplasm or I PMN.  She does not really have pain in this area so I do not think this is the etiology of her acute pain.  Given the severe hydronephrosis we did touch base with urology.  She has no signs of infected stone with normal white count and no significant infection in her urine.  They stated that based on the hydronephrosis she does not need to come in.  Patient felt much improved after interventions given here.  We informed her of the pancreatic duct dilatation with the need for close follow-up for likely MRI.  We also informed her of her right ovarian cysts.  Given her age she needs to have this closely followed including getting an ultrasound with gynecology.  Patient felt much improved and was given pain medication.  She will also strain her urine.  Patient discharged.  Diagnosis    ICD-10-CM   1. Calculus of ureter N20.1   2. Cyst of right ovary N83.201   3. Cyst  of pancreas K86.2   4. Other hydronephrosis N13.39         Angélica Freeman MD Robinson, Haley, MD  08/01/18 0116

## 2018-08-02 DIAGNOSIS — N20.0 CALCULUS OF KIDNEY: Primary | ICD-10-CM

## 2018-08-06 ENCOUNTER — OFFICE VISIT (OUTPATIENT)
Dept: UROLOGY | Facility: CLINIC | Age: 53
End: 2018-08-06
Payer: COMMERCIAL

## 2018-08-06 VITALS
BODY MASS INDEX: 20.09 KG/M2 | HEART RATE: 66 BPM | WEIGHT: 128 LBS | SYSTOLIC BLOOD PRESSURE: 110 MMHG | DIASTOLIC BLOOD PRESSURE: 66 MMHG | HEIGHT: 67 IN

## 2018-08-06 DIAGNOSIS — N20.0 CALCULUS OF KIDNEY: ICD-10-CM

## 2018-08-06 LAB
ALBUMIN UR-MCNC: NEGATIVE MG/DL
APPEARANCE UR: CLEAR
BILIRUB UR QL STRIP: NEGATIVE
COLOR UR AUTO: YELLOW
GLUCOSE UR STRIP-MCNC: NEGATIVE MG/DL
HGB UR QL STRIP: ABNORMAL
KETONES UR STRIP-MCNC: NEGATIVE MG/DL
LEUKOCYTE ESTERASE UR QL STRIP: NEGATIVE
NITRATE UR QL: NEGATIVE
PH UR STRIP: 5.5 PH (ref 5–7)
SOURCE: ABNORMAL
SP GR UR STRIP: <=1.005 (ref 1–1.03)
UROBILINOGEN UR STRIP-ACNC: 0.2 EU/DL (ref 0.2–1)

## 2018-08-06 PROCEDURE — 81003 URINALYSIS AUTO W/O SCOPE: CPT | Performed by: UROLOGY

## 2018-08-06 PROCEDURE — 99204 OFFICE O/P NEW MOD 45 MIN: CPT | Performed by: UROLOGY

## 2018-08-06 RX ORDER — TAMSULOSIN HYDROCHLORIDE 0.4 MG/1
0.4 CAPSULE ORAL DAILY
Qty: 14 CAPSULE | Refills: 0 | Status: SHIPPED | OUTPATIENT
Start: 2018-08-06 | End: 2018-10-10

## 2018-08-06 ASSESSMENT — PAIN SCALES - GENERAL: PAINLEVEL: MILD PAIN (2)

## 2018-08-06 NOTE — PROGRESS NOTES
Urology Consult History and Physical    Name: Elba Jeffries    MRN: [id number]   YOB: 1965       We were asked to see Elba Jeffries follow up from ER visit for evaluation and treatment of right ureteral stone.        Chief Complaint:   Right ureteral stone    History is obtained from the patient          History of Present Illness:   Elba Jeffries is a 52 year old female who is being seen for evaluation of right ureteral stone. She presented last Monday 7/30 for acute on-set of right low pelvic pain with radiation to the right flank. A CT scan was completed which demonstrated an obstructive 3mm right distal ureteral stone with associated severe hydroureteronephrosis. She was started on MET and discharged to home. She has been doing well with pain controlled with occasional NSAIDs. She has been diligently straining her urine with no evidence of stone passage. She continues to have intermittent right lower pelvic discomfort which is mild. She denies fevers, chills, hematuria or dysuria. She does report remote history of kidney stone (she thinks left sided) 20 years ago which required ureteroscopy.           Past Medical History:     Past Medical History:   Diagnosis Date     Amenorrhea 6/4/2008    secondary to endometrial ablation      Menometrorrhagia     resolved s/p endometrial ablation      PMS      PONV (postoperative nausea and vomiting)             Past Surgical History:     Past Surgical History:   Procedure Laterality Date     CYSTOSCOPY, SLING TRANSVAGINAL N/A 9/16/2016    Procedure: CYSTOSCOPY, SLING TRANSVAGINAL;  Surgeon: Bere Baez MD;  Location: [facility] OR     ENT SURGERY      sinus surgery     [facility] HYSTEROSCOPY, SURGICAL; W/ ENDOMETRIAL ABLATION, ANY METHOD  6/4/2008            Social History:     Social History   Substance Use Topics     Smoking status: Never Smoker     Smokeless tobacco: Never Used     Alcohol use Yes      Comment: occasional            Family History:     Family History  "  Problem Relation Age of Onset     YUKI Father      dx late 50s,  70.      Cardiovascular Father      hyperlipidemia     Hypertension Mother      Cerebrovascular Disease Mother      TIAs     GASTROINTESTINAL DISEASE Mother      Crohns              Allergies:   No Known Allergies         Medications:     Current Outpatient Prescriptions   Medication Sig     aspirin-acetaminophen-caffeine (EXCEDRIN MIGRAINE) 250-250-65 MG per tablet Take 1 tablet by mouth every 6 hours as needed for headaches     ondansetron (ZOFRAN ODT) 4 MG ODT tab Take 1 tablet (4 mg) by mouth every 6 hours as needed for nausea     tamsulosin (FLOMAX) 0.4 MG capsule Take 1 capsule (0.4 mg) by mouth daily     ZOLOFT 50 MG OR TABS 1 TAB PO QD (Once per day)     oxyCODONE-acetaminophen (PERCOCET) 5-325 MG per tablet Take 1-2 tablets by mouth every 4 hours as needed for pain (Patient not taking: Reported on 2018)     No current facility-administered medications for this visit.              Review of Systems:     Skin: negative  Eyes: negative  Ears/Nose/Throat: negative  Respiratory: No shortness of breath, dyspnea on exertion, cough, or hemoptysis  Cardiovascular: negative  Gastrointestinal: negative  Genitourinary: as above  Musculoskeletal: negative  Neurologic: negative  Psychiatric: negative  Hematologic/Lymphatic/Immunologic: negative  Endocrine: negative          Physical Exam:   Patient Vitals for the past 24 hrs:   BP Pulse Height Weight   18 1417 110/66 66 1.702 m (5' 7\") 58.1 kg (128 lb)     General: age-appropriate appearing female in NAD  HEENT: Head AT/NC, EOMI, CN Grossly intact  Lungs: no respiratory distress, or pursed lip breathing  Heart: No obvious jugular venous distension present  Back: no bony midline tenderness, no CVAT bilaterally.  Abdomen: soft, non-distended, mildly-tender in the right lower quadrant. No organomegaly  Flank: mild right CVA tenderness to palpation  Lymph: no palpable inguinal " lymphadenopathy.  LE: no edema.   Musculoskeltal: extremities normal, no peripheral edema  Skin: no suspicious lesions or rashes  Neuro: Alert, oriented, speech and mentation normal;  moving all 4 extremities equally.  Psych: affect and mood normal          Data:   All laboratory data reviewed:    UA RESULTS:  Recent Labs   Lab Test  08/06/18   1429  07/30/18   1057   COLOR  Yellow  Yellow   APPEARANCE  Clear  Clear   URINEGLC  Negative  Negative   URINEBILI  Negative  Negative   URINEKETONE  Negative  5*   SG  <=1.005  1.023   UBLD  Trace*  Small*   URINEPH  5.5  8.0*   PROTEIN  Negative  Negative   UROBILINOGEN  0.2   --    NITRITE  Negative  Negative   LEUKEST  Negative  Negative   RBCU   --   2   WBCU   --   2       All pertinent imaging reviewed:    All imaging studies reviewed by me.  I personally reviewed these imaging films with the patient.  A formal report from radiology will follow.  CT scan of the abdomen:   Kidneys: severe right hydroureteronephrosis   CT scan interpreted by radiologist  IMPRESSION:   1. Severe right hydronephrosis and hydroureter . 0.3 cm calcification  consistent with right ureterovesical junction stone. There is no right  perinephric stranding.  2. 0.2 cm nonobstructing mid right kidney stone.  3. 3.5 cm right ovarian cyst.  4. Mild pancreatic duct dilatation and nonspecific 0.5 x 0.9 cm cystic  lesion in the uncinate process of the pancreas. Cause of the duct  dilatation and cystic pancreatic lesion are not identified on this  exam but differential diagnosis includes cystic pancreatic neoplasm or  early IPMN. Comparison with any previous outside CT or upper abdominal  imaging is recommended as well as follow-up with pancreas MRI.            Impression and Plan:   Impression:   51 y/o female with a 3mm right distal ureteral stone with associated severe hydroureteronephrosis. We discussed treatment options which include continued MET vs ureteroscopy. We discussed the risks and  benefits of each option and that continued MET would be reasonable for 1-2 additional weeks. We discussed that should she develop fever or worsening pain with associated N/V these would be signs of possible infection which could make treatment emergent.      Plan:   - Flomax script refilled for 2 weeks  - Patient will discuss with  and call if she desires surgical scheduling prior to 2 weeks  - F/U in 2 weeks with renal ultrasound prior  - Continue to strain urine     Time spent: 30 minutes    Chapo Morales MD   Urology  [facility]  Clinic Phone [phone]

## 2018-08-06 NOTE — MR AVS SNAPSHOT
After Visit Summary   8/6/2018    Elba Jeffries    MRN: [id number]           Patient Information     Date Of Birth          1965        Visit Information        Provider Department      8/6/2018 2:00 PM Chapo Morales MD [facility]        Today's Diagnoses     Calculus of kidney           Follow-ups after your visit        Follow-up notes from your care team     Return in about 2 weeks (around 8/20/2018).      Your next 10 appointments already scheduled     Aug 22, 2018 12:00 PM CDT   US RENAL COMPLETE with SHUS5   [facility] Ultrasound ([facility])    [address] [address] [phone]           Please bring a list of your medicines (including vitamins, minerals and over-the-counter drugs). Also, tell your doctor about any allergies you may have. Wear comfortable clothes and leave your valuables at home.  You do not need to do anything special to prepare for your exam.  Please call the Imaging Department at your exam site with any questions.            Aug 22, 2018  1:00 PM CDT   Return Visit with Chapo Morales MD   [facility] ([facility])    [address]  [address]  [facility] [address] [phone]              Future tests that were ordered for you today     Open Future Orders        Priority Expected Expires Ordered    US Renal Complete [[id number]] Routine 8/20/2018 8/6/2019 8/6/2018            Who to contact     If you have questions or need follow up information about today's clinic visit or your schedule please contact [facility]OLOG[facility] directly at [phone].  Normal or non-critical lab and imaging results will be communicated to you by MyChart, letter or phone within 4 business days after the clinic has received the results. If you do not hear from us within 7 days, please contact the clinic through  "MyChart or phone. If you have a critical or abnormal lab result, we will notify you by phone as soon as possible.  Submit refill requests through [employer] or call your pharmacy and they will forward the refill request to us. Please allow 3 business days for your refill to be completed.          Additional Information About Your Visit        [employer] Information     [employer] gives you secure access to your electronic health record. If you see a primary care provider, you can also send messages to your care team and make appointments. If you have questions, please call your primary care clinic.  If you do not have a primary care provider, please call [phone] and they will assist you.        Care EveryWhere ID     This is your Care EveryWhere ID. This could be used by other organizations to access your [facility] medical records  [id number]        Your Vitals Were     Pulse Height BMI (Body Mass Index)             66 1.702 m (5' 7\") 20.05 kg/m2          Blood Pressure from Last 3 Encounters:   08/06/18 110/66   07/30/18 121/77   01/21/18 106/72    Weight from Last 3 Encounters:   08/06/18 58.1 kg (128 lb)   07/30/18 58.1 kg (128 lb)   01/21/18 63.5 kg (140 lb)              We Performed the Following     UA without Microscopic          Where to get your medicines      These medications were sent to "[facility] [id number] [place] [address] [facility] [address]  [address] [address] [facility] [address]     Phone:  [phone]     tamsulosin 0.4 MG capsule          Primary Care Provider Office Phone # Fax #    Abhay Marie [phone]       [address] [place] [address]        Equal Access to Services     JEREMY HARRIS : Jennyfer Hopper, paula acosta, tacho burnette. So [facility] [phone].    ATENCIÓN: Si habla español, tiene a grant disposición servicios gratuitos de asistencia lingüística. " Consuelo byrd [phone].    We comply with applicable federal civil rights laws and [place] laws. We do not discriminate on the basis of race, color, national origin, age, disability, sex, sexual orientation, or gender identity.            Thank you!     Thank you for choosing [facility]  for your care. Our goal is always to provide you with excellent care. Hearing back from our patients is one way we can continue to improve our services. Please take a few minutes to complete the written survey that you may receive in the mail after your visit with us. Thank you!             Your Updated Medication List - Protect others around you: Learn how to safely use, store and throw away your medicines at www.disposemymeds.org.          This list is accurate as of 8/6/18  2:47 PM.  Always use your most recent med list.                   Brand Name Dispense Instructions for use Diagnosis    aspirin-acetaminophen-caffeine 250-250-65 MG per tablet    EXCEDRIN MIGRAINE     Take 1 tablet by mouth every 6 hours as needed for headaches        ondansetron 4 MG ODT tab    ZOFRAN ODT    20 tablet    Take 1 tablet (4 mg) by mouth every 6 hours as needed for nausea        oxyCODONE-acetaminophen 5-325 MG per tablet    PERCOCET    12 tablet    Take 1-2 tablets by mouth every 4 hours as needed for pain        tamsulosin 0.4 MG capsule    FLOMAX    14 capsule    Take 1 capsule (0.4 mg) by mouth daily    Calculus of kidney       ZOLOFT 50 MG tablet   Generic drug:  sertraline     30    1 TAB PO QD (Once per day)

## 2018-08-06 NOTE — LETTER
8/6/2018       RE: Elba Jeffries  [address] [address]     Dear Colleague,    Thank you for referring your patient, Elba Jeffries, to the [facility] at [facility]. Please see a copy of my visit note below.    Urology Consult History and Physical    Name: Elba Jeffries    MRN: [id number]   YOB: 1965       We were asked to see Elba Jeffries follow up from ER visit for evaluation and treatment of right ureteral stone.        Chief Complaint:   Right ureteral stone    History is obtained from the patient          History of Present Illness:   Elba Jeffries is a 52 year old female who is being seen for evaluation of right ureteral stone. She presented last Monday 7/30 for acute on-set of right low pelvic pain with radiation to the right flank. A CT scan was completed which demonstrated an obstructive 3mm right distal ureteral stone with associated severe hydroureteronephrosis. She was started on MET and discharged to home. She has been doing well with pain controlled with occasional NSAIDs. She has been diligently straining her urine with no evidence of stone passage. She continues to have intermittent right lower pelvic discomfort which is mild. She denies fevers, chills, hematuria or dysuria. She does report remote history of kidney stone (she thinks left sided) 20 years ago which required ureteroscopy.           Past Medical History:     Past Medical History:   Diagnosis Date     Amenorrhea 6/4/2008    secondary to endometrial ablation      Menometrorrhagia     resolved s/p endometrial ablation      PMS      PONV (postoperative nausea and vomiting)             Past Surgical History:     Past Surgical History:   Procedure Laterality Date     CYSTOSCOPY, SLING TRANSVAGINAL N/A 9/16/2016    Procedure: CYSTOSCOPY, SLING TRANSVAGINAL;  Surgeon: Bere Baez MD;  Location:  OR     ENT SURGERY      sinus  "surgery     HC HYSTEROSCOPY, SURGICAL; W/ ENDOMETRIAL ABLATION, ANY METHOD  2008            Social History:     Social History   Substance Use Topics     Smoking status: Never Smoker     Smokeless tobacco: Never Used     Alcohol use Yes      Comment: occasional            Family History:     Family History   Problem Relation Age of Onset     C.A.D. Father      dx late 50s,  70.      Cardiovascular Father      hyperlipidemia     Hypertension Mother      Cerebrovascular Disease Mother      TIAs     GASTROINTESTINAL DISEASE Mother      Crohns              Allergies:   No Known Allergies         Medications:     Current Outpatient Prescriptions   Medication Sig     aspirin-acetaminophen-caffeine (EXCEDRIN MIGRAINE) 250-250-65 MG per tablet Take 1 tablet by mouth every 6 hours as needed for headaches     ondansetron (ZOFRAN ODT) 4 MG ODT tab Take 1 tablet (4 mg) by mouth every 6 hours as needed for nausea     tamsulosin (FLOMAX) 0.4 MG capsule Take 1 capsule (0.4 mg) by mouth daily     ZOLOFT 50 MG OR TABS 1 TAB PO QD (Once per day)     oxyCODONE-acetaminophen (PERCOCET) 5-325 MG per tablet Take 1-2 tablets by mouth every 4 hours as needed for pain (Patient not taking: Reported on 2018)     No current facility-administered medications for this visit.              Review of Systems:     Skin: negative  Eyes: negative  Ears/Nose/Throat: negative  Respiratory: No shortness of breath, dyspnea on exertion, cough, or hemoptysis  Cardiovascular: negative  Gastrointestinal: negative  Genitourinary: as above  Musculoskeletal: negative  Neurologic: negative  Psychiatric: negative  Hematologic/Lymphatic/Immunologic: negative  Endocrine: negative          Physical Exam:   Patient Vitals for the past 24 hrs:   BP Pulse Height Weight   18 1417 110/66 66 1.702 m (5' 7\") 58.1 kg (128 lb)     General: age-appropriate appearing female in NAD  HEENT: Head AT/NC, EOMI, CN Grossly intact  Lungs: no respiratory distress, or " pursed lip breathing  Heart: No obvious jugular venous distension present  Back: no bony midline tenderness, no CVAT bilaterally.  Abdomen: soft, non-distended, mildly-tender in the right lower quadrant. No organomegaly  Flank: mild right CVA tenderness to palpation  Lymph: no palpable inguinal lymphadenopathy.  LE: no edema.   Musculoskeltal: extremities normal, no peripheral edema  Skin: no suspicious lesions or rashes  Neuro: Alert, oriented, speech and mentation normal;  moving all 4 extremities equally.  Psych: affect and mood normal          Data:   All laboratory data reviewed:    UA RESULTS:  Recent Labs   Lab Test  08/06/18   1429  07/30/18   1057   COLOR  Yellow  Yellow   APPEARANCE  Clear  Clear   URINEGLC  Negative  Negative   URINEBILI  Negative  Negative   URINEKETONE  Negative  5*   SG  <=1.005  1.023   UBLD  Trace*  Small*   URINEPH  5.5  8.0*   PROTEIN  Negative  Negative   UROBILINOGEN  0.2   --    NITRITE  Negative  Negative   LEUKEST  Negative  Negative   RBCU   --   2   WBCU   --   2       All pertinent imaging reviewed:    All imaging studies reviewed by me.  I personally reviewed these imaging films with the patient.  A formal report from radiology will follow.  CT scan of the abdomen:   Kidneys: severe right hydroureteronephrosis   CT scan interpreted by radiologist  IMPRESSION:   1. Severe right hydronephrosis and hydroureter . 0.3 cm calcification  consistent with right ureterovesical junction stone. There is no right  perinephric stranding.  2. 0.2 cm nonobstructing mid right kidney stone.  3. 3.5 cm right ovarian cyst.  4. Mild pancreatic duct dilatation and nonspecific 0.5 x 0.9 cm cystic  lesion in the uncinate process of the pancreas. Cause of the duct  dilatation and cystic pancreatic lesion are not identified on this  exam but differential diagnosis includes cystic pancreatic neoplasm or  early IPMN. Comparison with any previous outside CT or upper abdominal  imaging is recommended  as well as follow-up with pancreas MRI.            Impression and Plan:   Impression:   51 y/o female with a 3mm right distal ureteral stone with associated severe hydroureteronephrosis. We discussed treatment options which include continued MET vs ureteroscopy. We discussed the risks and benefits of each option and that continued MET would be reasonable for 1-2 additional weeks. We discussed that should she develop fever or worsening pain with associated N/V these would be signs of possible infection which could make treatment emergent.      Plan:   - Flomax script refilled for 2 weeks  - Patient will discuss with  and call if she desires surgical scheduling prior to 2 weeks  - F/U in 2 weeks with renal ultrasound prior  - Continue to strain urine     Time spent: 30 minutes    Chapo Morales MD   Urology  [facility]  Clinic Phone [phone]

## 2018-08-16 ENCOUNTER — TRANSFERRED RECORDS (OUTPATIENT)
Dept: HEALTH INFORMATION MANAGEMENT | Facility: CLINIC | Age: 53
End: 2018-08-16

## 2018-08-16 DIAGNOSIS — N20.0 KIDNEY STONE: Primary | ICD-10-CM

## 2018-08-16 LAB — PAP SMEAR - HIM PATIENT REPORTED: NORMAL

## 2018-08-28 DIAGNOSIS — N20.0 CALCULUS OF KIDNEY: ICD-10-CM

## 2018-09-04 RX ORDER — TAMSULOSIN HYDROCHLORIDE 0.4 MG/1
CAPSULE ORAL
Qty: 14 CAPSULE | Refills: 0 | OUTPATIENT
Start: 2018-09-04

## 2018-10-10 ENCOUNTER — OFFICE VISIT (OUTPATIENT)
Dept: FAMILY MEDICINE | Facility: CLINIC | Age: 53
End: 2018-10-10
Payer: COMMERCIAL

## 2018-10-10 VITALS
HEART RATE: 79 BPM | SYSTOLIC BLOOD PRESSURE: 118 MMHG | HEIGHT: 67 IN | TEMPERATURE: 97.9 F | RESPIRATION RATE: 16 BRPM | BODY MASS INDEX: 20.51 KG/M2 | OXYGEN SATURATION: 98 % | DIASTOLIC BLOOD PRESSURE: 60 MMHG | WEIGHT: 130.7 LBS

## 2018-10-10 DIAGNOSIS — Z01.818 PREOP GENERAL PHYSICAL EXAM: Primary | ICD-10-CM

## 2018-10-10 DIAGNOSIS — Z87.442 HISTORY OF RENAL STONE: ICD-10-CM

## 2018-10-10 DIAGNOSIS — K86.9 LESION OF PANCREAS: ICD-10-CM

## 2018-10-10 DIAGNOSIS — Z82.49 FAMILY HISTORY OF CORONARY ARTERY DISEASE IN FATHER: ICD-10-CM

## 2018-10-10 LAB — HGB BLD-MCNC: 13.3 G/DL (ref 11.7–15.7)

## 2018-10-10 PROCEDURE — 93000 ELECTROCARDIOGRAM COMPLETE: CPT | Performed by: FAMILY MEDICINE

## 2018-10-10 PROCEDURE — 85018 HEMOGLOBIN: CPT | Performed by: FAMILY MEDICINE

## 2018-10-10 PROCEDURE — 82565 ASSAY OF CREATININE: CPT | Performed by: FAMILY MEDICINE

## 2018-10-10 PROCEDURE — 36415 COLL VENOUS BLD VENIPUNCTURE: CPT | Performed by: FAMILY MEDICINE

## 2018-10-10 PROCEDURE — 99214 OFFICE O/P EST MOD 30 MIN: CPT | Performed by: FAMILY MEDICINE

## 2018-10-10 NOTE — PATIENT INSTRUCTIONS
Before Your Surgery      Call your surgeon if there is any change in your health. This includes signs of a cold or flu (such as a sore throat, runny nose, cough, rash or fever).    Do not smoke, drink alcohol or take over the counter medicine (unless your surgeon or primary care doctor tells you to) for the 24 hours before and after surgery.    If you take prescribed drugs: Follow your doctor s orders about which medicines to take and which to stop until after surgery.  o Avoid NSAID's (e.g. Ibuprofen) and Aspirin 7 days prior to surgery, as discussed.    Eating and drinking prior to surgery: follow the instructions from your surgeon    Take a shower or bath the night before surgery. Use the soap your surgeon gave you to gently clean your skin. If you do not have soap from your surgeon, use your regular soap. Do not shave or scrub the surgery site.  Wear clean pajamas and have clean sheets on your bed.

## 2018-10-10 NOTE — PROGRESS NOTES
[facility]  [address] [facility] [address]  [phone]  Dept: [phone]    PRE-OP EVALUATION:  Today's date: 10/10/2018    Elba Jeffries (: 1965) presents for pre-operative evaluation assessment, as requested by Dr. Beltran.  She requires evaluation and anesthesia risk assessment prior to undergoing surgery/procedure for treatment of cyst on pancreas.    Fax number for surgical facility: [facility]  Primary Physician: Abhay Marie  Type of Anesthesia Anticipated: Local    Patient has a Health Care Directive or Living Will:  NO    Preop Questions 10/10/2018   Who is doing your surgery? Dr. Beltran   What are you having done? Endoscopic Ultrasound with possible biopsy.   Date of Surgery/Procedure: 10/23/18   Facility or Hospital where procedure/surgery will be performed: [facility]   1.  Do you have a history of Heart attack, stroke, stent, coronary bypass surgery, or other heart surgery? No   2.  Do you ever have any pain or discomfort in your chest? No   3.  Do you have a history of  Heart Failure? No   4.   Are you troubled by shortness of breath when:  walking on a level surface, or up a slight hill, or at night? No   5.  Do you currently have a cold, bronchitis or other respiratory infection? No   6.  Do you have a cough, shortness of breath, or wheezing? No   7.  Do you sometimes get pains in the calves of your legs when you walk? No   8. Do you or anyone in your family have previous history of blood clots? No   9.  Do you or does anyone in your family have a serious bleeding problem such as prolonged bleeding following surgeries or cuts? No   10. Have you ever had problems with anemia or been told to take iron pills? YES - Prior to endometrial ablation.   11. Have you had any abnormal blood loss such as black, tarry or bloody stools, or abnormal vaginal bleeding? No   12. Have you ever had a blood transfusion? No   13. Have you or any of your  "relatives ever had problems with anesthesia? No   14. Do you have sleep apnea, excessive snoring or daytime drowsiness? No   15. Do you have any prosthetic heart valves? No   16. Do you have prosthetic joints? No   17. Is there any chance that you may be pregnant? No         HPI:     HPI related to upcoming procedure: The patient is a 53-year-old female, with history of incidental pancreas lesion seen on 7/30/18 CT imaging to evaluate for a kidney stone.  Kidney stone passed without intervention, but hydronephrosis was noted on her CT.  Patient denies fever, chills, nausea, vomiting, jaundice, abdominal pain, or back pain.  Plan is to proceed with an endoscopic ultrasound (with possible biopsy) to further evaluate the pancreas lesion.  Plan is to proceed with sedation rather than general anesthesia, per patient.    Patient states, \"I walk a lot\".  No chest pain, shortness of breath, or exertional symptoms reported.  No history of hypertension, hyperlipidemia, diabetes, or CAD.  Family history includes a father with history of CAD in his 50s.    History of postoperative nausea and vomiting, per patient.    MEDICAL HISTORY:     Patient Active Problem List    Diagnosis Date Noted     Hx of renal calculi 10/10/2018     Priority: Medium     07/2018       Lesion of pancreas 10/10/2018     Priority: Medium     Incidental \"0.5 x 0.9 cm cystic lesion in the uncinate process of the pancreas\" seen on 07/30/18 CT imaging.       CARDIOVASCULAR SCREENING; LDL GOAL LESS THAN 160 09/06/2016     Priority: Medium      Past Medical History:   Diagnosis Date     Amenorrhea 06/04/2008    Secondary to endometrial ablation      Menometrorrhagia     Resolved s/p endometrial ablation      PMS      PONV (postoperative nausea and vomiting)      Past Surgical History:   Procedure Laterality Date     CYSTOSCOPY, SLING TRANSVAGINAL N/A 9/16/2016    Procedure: CYSTOSCOPY, SLING TRANSVAGINAL;  Surgeon: Bere Baez MD;  Location:  OR     ENT " SURGERY      sinus surgery     HC HYSTEROSCOPY, SURGICAL; W/ ENDOMETRIAL ABLATION, ANY METHOD  2008     Current Outpatient Prescriptions   Medication Sig Dispense Refill     aspirin-acetaminophen-caffeine (EXCEDRIN MIGRAINE) 250-250-65 MG per tablet Take 1 tablet by mouth every 6 hours as needed for headaches       ondansetron (ZOFRAN ODT) 4 MG ODT tab Take 1 tablet (4 mg) by mouth every 6 hours as needed for nausea 20 tablet 0     ZOLOFT 50 MG OR TABS 1 TAB PO QD (Once per day) 30 1     OTC products: Ibuprofen as needed.  Excedrin Migraine once/month.    No Known Allergies     Latex Allergy: NO    Family History   Problem Relation Age of Onset     C.A.D. Father      Dx 50's,  70.      Cardiovascular Father      Hyperlipidemia     Hypertension Mother      Cerebrovascular Disease Mother      TIAs     GASTROINTESTINAL DISEASE Mother      Crohns     Social History   Substance Use Topics     Smoking status: Never Smoker     Smokeless tobacco: Never Used     Alcohol use Yes      Comment: occasional     History   Drug Use No       REVIEW OF SYSTEMS:   CONSTITUTIONAL: NEGATIVE for fever, chills, change in weight  INTEGUMENTARY/SKIN: NEGATIVE for worrisome rashes, jaundice, moles, or lesions  EYES: NEGATIVE for vision changes or irritation  ENT/MOUTH: NEGATIVE for ear, mouth and throat problems  RESP: NEGATIVE for significant cough or SOB  BREAST: NEGATIVE for masses, tenderness or discharge  CV: NEGATIVE for chest pain, palpitations or peripheral edema  GI: NEGATIVE for nausea, abdominal pain, heartburn, or change in bowel habits  : NEGATIVE for frequency, dysuria, or hematuria since the kidney stone passed in 2018  MUSCULOSKELETAL: NEGATIVE for significant arthralgias or myalgia  NEURO: NEGATIVE for weakness, dizziness or paresthesias.  Occasional muscle aches with weather changes.  ENDOCRINE: NEGATIVE for temperature intolerance, skin/hair changes  HEME: NEGATIVE for bleeding problems  PSYCHIATRIC:  No  "depression or anxiety symptoms on Zoloft.    EXAM:   /60 (BP Location: Right arm, Patient Position: Chair, Cuff Size: Adult Regular)  Pulse 79  Temp 97.9  F (36.6  C) (Oral)  Resp 16  Ht 5' 7\" (1.702 m)  Wt 130 lb 11.2 oz (59.3 kg)  SpO2 98%  Breastfeeding? No  BMI 20.47 kg/m2       GENERAL APPEARANCE: healthy, alert and no distress     EYES: EOMI, PERRL     HENT: ear canals and TM's normal and nose and mouth without ulcers or lesions     NECK: no adenopathy, no asymmetry, masses, or scars and thyroid normal to palpation     RESP: lungs clear to auscultation - no rales, rhonchi or wheezes     CV: regular rates and rhythm, normal S1 S2, no S3 or S4 and no murmur, click or rub     ABDOMEN:  soft, nontender, no HSM or masses and bowel sounds normal     MS: extremities normal- no gross deformities noted, no evidence of inflammation in joints, FROM in all extremities.     SKIN: no suspicious lesions or rashes     NEURO: Normal strength and tone, sensory exam grossly normal, mentation intact and speech normal     PSYCH: mentation appears normal. and affect normal/bright     LYMPHATICS: No cervical adenopathy    DIAGNOSTICS:     EKG: appears normal, NSR, normal axis, normal intervals, no acute ST/T changes c/w ischemia, no LVH by voltage criteria    Recent Labs   Lab Test  07/30/18   0917  09/06/16   1544   HGB  14.0  13.0   PLT  241  240   NA  140  140   POTASSIUM  3.6  4.5   CR  0.74  0.69      LABORATORY:  Office Visit on 10/10/2018   Component Date Value Ref Range Status     Hemoglobin 10/10/2018 13.3  11.7 - 15.7 g/dL Final     Creatinine 10/10/2018 0.67  0.52 - 1.04 mg/dL Final     GFR Estimate 10/10/2018 >90  >60 mL/min/1.7m2 Final    Non  GFR Calc     GFR Estimate If Black 10/10/2018 >90  >60 mL/min/1.7m2 Final    African American GFR Calc      IMPRESSION:     Reason for surgery/procedure: Endoscopic ultrasound (and possible biopsy) to evaluate an incidental pancreas lesion.    The " proposed surgical procedure is considered LOW risk.    REVISED CARDIAC RISK INDEX  The patient has the following serious cardiovascular risks for perioperative complications such as (MI, PE, VFib and 3  AV Block):  No serious cardiac risks  INTERPRETATION: 0 risks: Class I (very low risk - 0.4% complication rate)    The patient has the following additional risks for perioperative complications:  No identified additional risks  The ASCVD Risk score (Warrenton DC Jr, et al., 2013) failed to calculate for the following reasons:    Cannot find a previous HDL lab    Cannot find a previous total cholesterol lab      ICD-10-CM    1. Preop general physical exam prior to endoscopic ultrasound and possible biopsy. Z01.818 Hemoglobin     Creatinine     EKG 12-lead complete w/read - Clinics   2. Lesion of pancreas, incidentally noted on 7/30/18 CT imaging. K86.9    3. History of renal stone and hydronephrosis 07/2018. Z87.442 Creatinine   4. Family history of coronary artery disease in father. Z82.49 EKG 12-lead complete w/read - Clinics       RECOMMENDATIONS:       Cardiovascular Risk  Performs 4 METs exercise without symptoms (Walk on level ground at 15 minutes per mile (4 miles/hour)) .       --Patient is to take all scheduled medications on the day of surgery EXCEPT for modifications listed below.    Anticoagulant or Antiplatelet Medication Use  ASPIRIN: Avoid 7 days prior to surgery.  NSAIDS: Avoid 7 days prior to surgery.      APPROVAL GIVEN to proceed with proposed procedure, without further diagnostic evaluation.       Signed Electronically by: Kayla Koehler MD    Copy of this evaluation report is provided to requesting physician.    [facility] Preop Guidelines    Revised Cardiac Risk Index

## 2018-10-10 NOTE — MR AVS SNAPSHOT
After Visit Summary   10/10/2018    Elba Jeffries    MRN: [id number]           Patient Information     Date Of Birth          1965        Visit Information        Provider Department      10/10/2018 1:40 PM Kayla Koehler MD [facility]        Today's Diagnoses     Preop general physical exam    -  1    Lesion of pancreas        History of renal stone        Family history of coronary artery disease in father          Care Instructions      Before Your Surgery      Call your surgeon if there is any change in your health. This includes signs of a cold or flu (such as a sore throat, runny nose, cough, rash or fever).    Do not smoke, drink alcohol or take over the counter medicine (unless your surgeon or primary care doctor tells you to) for the 24 hours before and after surgery.    If you take prescribed drugs: Follow your doctor s orders about which medicines to take and which to stop until after surgery.  o Avoid NSAID's (e.g. Ibuprofen) and Aspirin 7 days prior to surgery, as discussed.    Eating and drinking prior to surgery: follow the instructions from your surgeon    Take a shower or bath the night before surgery. Use the soap your surgeon gave you to gently clean your skin. If you do not have soap from your surgeon, use your regular soap. Do not shave or scrub the surgery site.  Wear clean pajamas and have clean sheets on your bed.           Follow-ups after your visit        Your next 10 appointments already scheduled     Oct 23, 2018   Procedure with Nguyen Beltran MD   [facility] ([facility])    [address]  [place] [address]   [phone]           [facility] is located at [address] [place]              Who to contact     If you have questions or need follow up information about today's clinic visit or your schedule please contact [facility] directly at  "[phone].  Normal or non-critical lab and imaging results will be communicated to you by MyChart, letter or phone within 4 business days after the clinic has received the results. If you do not hear from us within 7 days, please contact the clinic through [employer] or phone. If you have a critical or abnormal lab result, we will notify you by phone as soon as possible.  Submit refill requests through [employer] or call your pharmacy and they will forward the refill request to us. Please allow 3 business days for your refill to be completed.          Additional Information About Your Visit        [employer] Information     [employer] gives you secure access to your electronic health record. If you see a primary care provider, you can also send messages to your care team and make appointments. If you have questions, please call your primary care clinic.  If you do not have a primary care provider, please call [phone] and they will assist you.        Care EveryWhere ID     This is your Care EveryWhere ID. This could be used by other organizations to access your [facility] medical records  [id number]        Your Vitals Were     Pulse Temperature Respirations Height Pulse Oximetry Breastfeeding?    79 97.9  F (36.6  C) (Oral) 16 5' 7\" (1.702 m) 98% No    BMI (Body Mass Index)                   20.47 kg/m2            Blood Pressure from Last 3 Encounters:   10/10/18 118/60   08/06/18 110/66   07/30/18 121/77    Weight from Last 3 Encounters:   10/10/18 130 lb 11.2 oz (59.3 kg)   08/06/18 128 lb (58.1 kg)   07/30/18 128 lb (58.1 kg)              We Performed the Following     Creatinine     EKG 12-lead complete w/read - Clinics     Hemoglobin        Primary Care Provider Office Phone # Fax #    Abhay Marie [phone]       [facility] [place] [facility] [place] [address] [place] [address]        Equal Access to Services     JEREMY HARRIS : Jennyfer Hopper, paula acosta, qaybta tacho patel " fernando chaseriyahansel ortizmadeleineleonie ah. So [facility] [phone].    ATENCIÓN: Si habla dickson, tiene a grant disposición servicios gratuitos de asistencia lingüística. Consuelo al [phone].    We comply with applicable federal civil rights laws and [place] laws. We do not discriminate on the basis of race, color, national origin, age, disability, sex, sexual orientation, or gender identity.            Thank you!     Thank you for choosing [facility]  for your care. Our goal is always to provide you with excellent care. Hearing back from our patients is one way we can continue to improve our services. Please take a few minutes to complete the written survey that you may receive in the mail after your visit with us. Thank you!             Your Updated Medication List - Protect others around you: Learn how to safely use, store and throw away your medicines at www.disposemymeds.org.          This list is accurate as of 10/10/18 11:59 PM.  Always use your most recent med list.                   Brand Name Dispense Instructions for use Diagnosis    aspirin-acetaminophen-caffeine 250-250-65 MG per tablet    EXCEDRIN MIGRAINE     Take 1 tablet by mouth every 6 hours as needed for headaches        ondansetron 4 MG ODT tab    ZOFRAN ODT    20 tablet    Take 1 tablet (4 mg) by mouth every 6 hours as needed for nausea        ZOLOFT 50 MG tablet   Generic drug:  sertraline     30    1 TAB PO QD (Once per day)

## 2018-10-11 LAB
CREAT SERPL-MCNC: 0.67 MG/DL (ref 0.52–1.04)
GFR SERPL CREATININE-BSD FRML MDRD: >90 ML/MIN/1.7M2

## 2018-10-23 ENCOUNTER — ANESTHESIA EVENT (OUTPATIENT)
Dept: GASTROENTEROLOGY | Facility: CLINIC | Age: 53
End: 2018-10-23
Payer: COMMERCIAL

## 2018-10-23 ENCOUNTER — SURGERY (OUTPATIENT)
Age: 53
End: 2018-10-23

## 2018-10-23 ENCOUNTER — HOSPITAL ENCOUNTER (OUTPATIENT)
Facility: CLINIC | Age: 53
Discharge: HOME | End: 2018-10-23
Attending: INTERNAL MEDICINE | Admitting: INTERNAL MEDICINE
Payer: COMMERCIAL

## 2018-10-23 ENCOUNTER — ANESTHESIA (OUTPATIENT)
Dept: GASTROENTEROLOGY | Facility: CLINIC | Age: 53
End: 2018-10-23
Payer: COMMERCIAL

## 2018-10-23 VITALS
DIASTOLIC BLOOD PRESSURE: 80 MMHG | OXYGEN SATURATION: 97 % | WEIGHT: 130 LBS | HEIGHT: 67 IN | RESPIRATION RATE: 16 BRPM | BODY MASS INDEX: 20.4 KG/M2 | SYSTOLIC BLOOD PRESSURE: 126 MMHG

## 2018-10-23 LAB — UPPER EUS: NORMAL

## 2018-10-23 PROCEDURE — 88342 IMHCHEM/IMCYTCHM 1ST ANTB: CPT | Mod: 26 | Performed by: INTERNAL MEDICINE

## 2018-10-23 PROCEDURE — 43259 EGD US EXAM DUODENUM/JEJUNUM: CPT | Performed by: INTERNAL MEDICINE

## 2018-10-23 PROCEDURE — 37000008 ZZH ANESTHESIA TECHNICAL FEE, 1ST 30 MIN: Performed by: INTERNAL MEDICINE

## 2018-10-23 PROCEDURE — 43239 EGD BIOPSY SINGLE/MULTIPLE: CPT | Mod: XS | Performed by: INTERNAL MEDICINE

## 2018-10-23 PROCEDURE — 88342 IMHCHEM/IMCYTCHM 1ST ANTB: CPT | Performed by: INTERNAL MEDICINE

## 2018-10-23 PROCEDURE — 40000010 ZZH STATISTIC ANES STAT CODE-CRNA PER MINUTE: Performed by: INTERNAL MEDICINE

## 2018-10-23 PROCEDURE — 88305 TISSUE EXAM BY PATHOLOGIST: CPT | Mod: 26 | Performed by: INTERNAL MEDICINE

## 2018-10-23 PROCEDURE — 25000128 H RX IP 250 OP 636: Performed by: NURSE ANESTHETIST, CERTIFIED REGISTERED

## 2018-10-23 PROCEDURE — 88305 TISSUE EXAM BY PATHOLOGIST: CPT | Performed by: INTERNAL MEDICINE

## 2018-10-23 RX ORDER — SODIUM CHLORIDE, SODIUM LACTATE, POTASSIUM CHLORIDE, CALCIUM CHLORIDE 600; 310; 30; 20 MG/100ML; MG/100ML; MG/100ML; MG/100ML
INJECTION, SOLUTION INTRAVENOUS CONTINUOUS PRN
Status: DISCONTINUED | OUTPATIENT
Start: 2018-10-23 | End: 2018-10-23

## 2018-10-23 RX ORDER — ONDANSETRON 2 MG/ML
INJECTION INTRAMUSCULAR; INTRAVENOUS PRN
Status: DISCONTINUED | OUTPATIENT
Start: 2018-10-23 | End: 2018-10-23

## 2018-10-23 RX ORDER — SODIUM CHLORIDE, SODIUM LACTATE, POTASSIUM CHLORIDE, CALCIUM CHLORIDE 600; 310; 30; 20 MG/100ML; MG/100ML; MG/100ML; MG/100ML
INJECTION, SOLUTION INTRAVENOUS CONTINUOUS
Status: DISCONTINUED | OUTPATIENT
Start: 2018-10-23 | End: 2018-10-23 | Stop reason: HOSPADM

## 2018-10-23 RX ORDER — ONDANSETRON 4 MG/1
4 TABLET, ORALLY DISINTEGRATING ORAL EVERY 30 MIN PRN
Status: DISCONTINUED | OUTPATIENT
Start: 2018-10-23 | End: 2018-10-23 | Stop reason: HOSPADM

## 2018-10-23 RX ORDER — KETOROLAC TROMETHAMINE 30 MG/ML
30 INJECTION, SOLUTION INTRAMUSCULAR; INTRAVENOUS EVERY 6 HOURS PRN
Status: DISCONTINUED | OUTPATIENT
Start: 2018-10-23 | End: 2018-10-23 | Stop reason: HOSPADM

## 2018-10-23 RX ORDER — NALOXONE HYDROCHLORIDE 0.4 MG/ML
.1-.4 INJECTION, SOLUTION INTRAMUSCULAR; INTRAVENOUS; SUBCUTANEOUS
Status: DISCONTINUED | OUTPATIENT
Start: 2018-10-23 | End: 2018-10-23

## 2018-10-23 RX ORDER — FLUMAZENIL 0.1 MG/ML
0.2 INJECTION, SOLUTION INTRAVENOUS
Status: DISCONTINUED | OUTPATIENT
Start: 2018-10-23 | End: 2018-10-23 | Stop reason: HOSPADM

## 2018-10-23 RX ORDER — PROPOFOL 10 MG/ML
INJECTION, EMULSION INTRAVENOUS PRN
Status: DISCONTINUED | OUTPATIENT
Start: 2018-10-23 | End: 2018-10-23

## 2018-10-23 RX ORDER — LIDOCAINE 40 MG/G
CREAM TOPICAL
Status: DISCONTINUED | OUTPATIENT
Start: 2018-10-23 | End: 2018-10-23 | Stop reason: HOSPADM

## 2018-10-23 RX ORDER — FENTANYL CITRATE 50 UG/ML
25-50 INJECTION, SOLUTION INTRAMUSCULAR; INTRAVENOUS
Status: DISCONTINUED | OUTPATIENT
Start: 2018-10-23 | End: 2018-10-23 | Stop reason: HOSPADM

## 2018-10-23 RX ORDER — ONDANSETRON 2 MG/ML
4 INJECTION INTRAMUSCULAR; INTRAVENOUS EVERY 30 MIN PRN
Status: DISCONTINUED | OUTPATIENT
Start: 2018-10-23 | End: 2018-10-23 | Stop reason: HOSPADM

## 2018-10-23 RX ORDER — NALOXONE HYDROCHLORIDE 0.4 MG/ML
.1-.4 INJECTION, SOLUTION INTRAMUSCULAR; INTRAVENOUS; SUBCUTANEOUS
Status: DISCONTINUED | OUTPATIENT
Start: 2018-10-23 | End: 2018-10-23 | Stop reason: HOSPADM

## 2018-10-23 RX ORDER — DIAZEPAM 10 MG/2ML
2.5 INJECTION, SOLUTION INTRAMUSCULAR; INTRAVENOUS
Status: DISCONTINUED | OUTPATIENT
Start: 2018-10-23 | End: 2018-10-23 | Stop reason: HOSPADM

## 2018-10-23 RX ORDER — PROPOFOL 10 MG/ML
INJECTION, EMULSION INTRAVENOUS CONTINUOUS PRN
Status: DISCONTINUED | OUTPATIENT
Start: 2018-10-23 | End: 2018-10-23

## 2018-10-23 RX ORDER — ACETAMINOPHEN 650 MG/1
650 SUPPOSITORY RECTAL EVERY 4 HOURS PRN
Status: DISCONTINUED | OUTPATIENT
Start: 2018-10-23 | End: 2018-10-23 | Stop reason: HOSPADM

## 2018-10-23 RX ORDER — HYDROMORPHONE HYDROCHLORIDE 1 MG/ML
.3-.5 INJECTION, SOLUTION INTRAMUSCULAR; INTRAVENOUS; SUBCUTANEOUS EVERY 10 MIN PRN
Status: DISCONTINUED | OUTPATIENT
Start: 2018-10-23 | End: 2018-10-23 | Stop reason: HOSPADM

## 2018-10-23 RX ORDER — FENTANYL CITRATE 50 UG/ML
25-100 INJECTION, SOLUTION INTRAMUSCULAR; INTRAVENOUS
Status: DISCONTINUED | OUTPATIENT
Start: 2018-10-23 | End: 2018-10-23 | Stop reason: HOSPADM

## 2018-10-23 RX ORDER — MEPERIDINE HYDROCHLORIDE 25 MG/ML
12.5 INJECTION INTRAMUSCULAR; INTRAVENOUS; SUBCUTANEOUS
Status: DISCONTINUED | OUTPATIENT
Start: 2018-10-23 | End: 2018-10-23 | Stop reason: HOSPADM

## 2018-10-23 RX ADMIN — ONDANSETRON 4 MG: 2 INJECTION INTRAMUSCULAR; INTRAVENOUS at 09:34

## 2018-10-23 RX ADMIN — SODIUM CHLORIDE, POTASSIUM CHLORIDE, SODIUM LACTATE AND CALCIUM CHLORIDE: 600; 310; 30; 20 INJECTION, SOLUTION INTRAVENOUS at 09:31

## 2018-10-23 RX ADMIN — PROPOFOL 40 MG: 10 INJECTION, EMULSION INTRAVENOUS at 09:34

## 2018-10-23 RX ADMIN — PROPOFOL 30 MG: 10 INJECTION, EMULSION INTRAVENOUS at 09:38

## 2018-10-23 RX ADMIN — PROPOFOL 100 MCG/KG/MIN: 10 INJECTION, EMULSION INTRAVENOUS at 09:34

## 2018-10-23 NOTE — ANESTHESIA POSTPROCEDURE EVALUATION
Patient: Elba Jeffries    Procedure(s):  ENDOSCOPIC ULTRASOUND, ESOPHAGOSCOPY, GASTROSCOPY, DUODENOSCOPY (EGD) (MAC)  COMBINED ESOPHAGOSCOPY, GASTROSCOPY, DUODENOSCOPY (EGD), BIOPSY SINGLE OR MULTIPLE    Diagnosis:PANCREATIC CYST  Diagnosis Additional Information: No value filed.    Anesthesia Type:  MAC    Note:  Anesthesia Post Evaluation    Patient location during evaluation: PACU  Patient participation: Able to fully participate in evaluation  Level of consciousness: awake  Pain management: adequate  Airway patency: patent  Cardiovascular status: acceptable  Respiratory status: acceptable  Hydration status: acceptable  PONV: controlled     Anesthetic complications: None          Last vitals:  Vitals:    10/23/18 1011 10/23/18 1020 10/23/18 1030   BP:  120/83 126/80   Resp:      SpO2: 99% 100% 97%         Electronically Signed By: Aba Torres MD  October 23, 2018  3:49 PM

## 2018-10-23 NOTE — ANESTHESIA PREPROCEDURE EVALUATION
Anesthesia Evaluation     . Pt has had prior anesthetic.     History of anesthetic complications   - PONV        ROS/MED HX    ENT/Pulmonary:      (-) sleep apnea   Neurologic:       Cardiovascular:         METS/Exercise Tolerance:     Hematologic:         Musculoskeletal:         GI/Hepatic:        (-) GERD   Renal/Genitourinary:     (+) Other Renal/ Genitourinary, pacreatic lesion found, procedure now to evaluate it      Endo:         Psychiatric:     (+) psychiatric history depression      Infectious Disease:         Malignancy:         Other:                     Physical Exam  Normal systems: cardiovascular and pulmonary    Airway   Mallampati: II  TM distance: >3 FB  Neck ROM: full    Dental   (+) caps    Cardiovascular       Pulmonary                     Anesthesia Plan      History & Physical Review  History and physical reviewed and following examination; no interval change.    ASA Status:  2 .    NPO Status:  > 8 hours    Plan for MAC Reason for MAC:  Procedure to face, neck, head or breast  PONV prophylaxis:  Ondansetron (or other 5HT-3)  No opioid given PONV history.    Propofol      Postoperative Care  Postoperative pain management:  IV analgesics.      Consents  Anesthetic plan, risks, benefits and alternatives discussed with:  Patient..                          .

## 2018-10-23 NOTE — ANESTHESIA CARE TRANSFER NOTE
Patient: Elba Jeffries    Procedure(s):  ENDOSCOPIC ULTRASOUND, ESOPHAGOSCOPY, GASTROSCOPY, DUODENOSCOPY (EGD) (MAC)    Diagnosis: PANCREATIC CYST  Diagnosis Additional Information: No value filed.    Anesthesia Type:   MAC     Note:  Airway :Room Air  Patient transferred to:Phase II  Handoff Report: Identifed the Patient, Identified the Reponsible Provider, Reviewed the pertinent medical history, Discussed the surgical course, Reviewed Intra-OP anesthesia mangement and issues during anesthesia, Set expectations for post-procedure period and Allowed opportunity for questions and acknowledgement of understanding      Vitals: (Last set prior to Anesthesia Care Transfer)    CRNA VITALS  10/23/2018 0921 - 10/23/2018 0951      10/23/2018             Resp Rate (set): 10                Electronically Signed By: ENRIKE Cardoso CRNA  October 23, 2018  9:51 AM

## 2018-10-24 LAB — COPATH REPORT: NORMAL

## 2019-04-24 ENCOUNTER — OFFICE VISIT (OUTPATIENT)
Dept: FAMILY MEDICINE | Facility: CLINIC | Age: 54
End: 2019-04-24
Payer: COMMERCIAL

## 2019-04-24 VITALS
HEART RATE: 69 BPM | DIASTOLIC BLOOD PRESSURE: 70 MMHG | OXYGEN SATURATION: 97 % | RESPIRATION RATE: 16 BRPM | TEMPERATURE: 98.1 F | HEIGHT: 67 IN | WEIGHT: 138.1 LBS | BODY MASS INDEX: 21.67 KG/M2 | SYSTOLIC BLOOD PRESSURE: 110 MMHG

## 2019-04-24 DIAGNOSIS — M79.672 LEFT FOOT PAIN: Primary | ICD-10-CM

## 2019-04-24 PROCEDURE — 99213 OFFICE O/P EST LOW 20 MIN: CPT | Performed by: FAMILY MEDICINE

## 2019-04-24 RX ORDER — ONDANSETRON 4 MG/1
TABLET, ORALLY DISINTEGRATING ORAL
Refills: 0 | COMMUNITY
Start: 2018-07-30

## 2019-04-24 RX ORDER — LIDOCAINE 50 MG/G
PATCH TOPICAL
COMMUNITY
Start: 2018-11-21

## 2019-04-24 ASSESSMENT — MIFFLIN-ST. JEOR: SCORE: 1254.05

## 2019-04-24 NOTE — PROGRESS NOTES
SUBJECTIVE:   Elba Jeffries is a 53 year old female presenting with a chief complaint of   Chief Complaint   Patient presents with     Musculoskeletal Problem     left       She is an established patient of [facility].    Left Foot Pain    Onset of symptoms was 2 week(s) ago.  Location: Left foot  Context:        Mechanism: None.  No injury reported.      Patient experiences pain involving the top of her left foot with prolonged ambulation.  Course of symptoms is stable.    Severity of pain is 6/10 severity with ambulation, typically negligible at rest.    Current and Associated symptoms: Patient reports left foot pain, with occasional swelling at night.  No current swelling noted.  Patient frequently walks in flats.  Denies erythema or other joint concerns.  Aggravating Factors:  Walking.  Therapies to improve symptoms include:  Ice and elevation.  Patient avoids Ibuprofen, due to her history of ulcer disease.  She uses CBD oil, with some improvement.  This is the first time this type of problem has occurred for this patient.     Review of Systems   No paresthesias or weakness.  No chest pain or shortness of breath.    Patient Active Problem List   Diagnosis     CARDIOVASCULAR SCREENING; LDL GOAL LESS THAN 160     Hx of renal calculi     Lesion of pancreas     Past Medical History:   Diagnosis Date     Amenorrhea 2008    Secondary to endometrial ablation      Anemia     Prior to endometrial ablation.     Kidney stones      Menometrorrhagia     Resolved s/p endometrial ablation      PMS      PONV (postoperative nausea and vomiting)      Vertigo      Family History   Problem Relation Age of Onset     C.A.D. Father         Dx 50's,  70.      Cardiovascular Father         Hyperlipidemia     Hypertension Mother      Cerebrovascular Disease Mother         TIAs     Gastrointestinal Disease Mother         Crohns     Current Outpatient Medications   Medication Sig Dispense Refill     lidocaine (LIDODERM) 5 % patch  "Apply on dry, clean, hairless skin. Apply 1 patch to painful area of skin for up to to 12 hours within 24 hour period.       ondansetron (ZOFRAN-ODT) 4 MG ODT tab DIS ONE T PO  Q 6 HOURS PRN FOR NAUSEA  0     ZOLOFT 50 MG OR TABS 1 TAB PO QD (Once per day) 30 1     Social History     Tobacco Use     Smoking status: Never Smoker     Smokeless tobacco: Never Used   Substance Use Topics     Alcohol use: Yes     Comment: occasional       OBJECTIVE  /70 (BP Location: Right arm, Patient Position: Chair, Cuff Size: Adult Regular)   Pulse 69   Temp 98.1  F (36.7  C) (Oral)   Resp 16   Ht 1.702 m (5' 7\")   Wt 62.6 kg (138 lb 1.6 oz)   SpO2 97%   Breastfeeding? No   BMI 21.63 kg/m      Physical Exam    GENERAL APPEARANCE:  Awake, alert, and in no acute distress.  PSYCHIATRIC:  Pleasant, smiling affect.  HEENT:  Sclera anicteric.  No conjunctivitis.    NECK:  Spontaneous full range of motion.    HEART:  Normal S1, S2.  Regular rate and rhythm.  No murmurs, rubs, or gallops.  LUNGS:  No respiratory distress.  No wheezes, rales, or rhonchi.  ABDOMEN:  Not distended.    EXTREMITIES:  Moves 4 extremities.     LEFT FOOT: Full range of motion, without gross abnormalities.  There is tenderness to palpation involving the distal metatarsals first through third metatarsals.  There is also tenderness to palpation noted 1 cm below the second and third toes involving the sole of the foot.  The remainder of the foot, ankle, and lower leg are not tender to palpation.  No erythema, edema, or ecchymosis noted.  Distal pulses intact.  Achilles tendon palpates intact.  Anterior drawer is negative.  5/5 strength noted with plantarflexion and dorsiflexion.  NEUROLOGIC:  Gait within normal limits, without obvious limp.      Labs:  No results found for this or any previous visit (from the past 24 hour(s)).    X-ray Left Foot:  Discussed with and declined by patient.    ASSESSMENT:      ICD-10-CM    1. Left foot pain, aggravated by " prolonged walking.  Differential includes metatarsalgia and Sheth's neuroma. M79.672 PODIATRY/FOOT & ANKLE SURGERY REFERRAL      PLAN:    Patient Instructions     Elevated the affected extremity.    Ice 20 minutes three times daily, as directed.    Over-the-counter medications (e.g. Tylenol), only as directed.    Wear shoes with good arch support, avoid wearing flats, and consider orthotics.    Follow up with Podiatry if worsening condition or not improving over the next week.      Discussed risks and benefits of treatment strategies, as noted in the Assessment and Plan sections.    The patient was discharged ambulatory and in stable condition post discussion of follow up.     Kayla Koehler MD  [facility]

## 2019-04-24 NOTE — PATIENT INSTRUCTIONS
Elevated the affected extremity.    Ice 20 minutes three times daily, as directed.    Over-the-counter medications (e.g. Tylenol), only as directed.    Wear shoes with good arch support, avoid wearing flats, and consider orthotics.    Follow up with Podiatry if worsening condition or not improving over the next week.

## 2019-12-09 ENCOUNTER — HEALTH MAINTENANCE LETTER (OUTPATIENT)
Age: 54
End: 2019-12-09

## 2019-12-31 ENCOUNTER — TELEPHONE (OUTPATIENT)
Dept: INTERNAL MEDICINE | Facility: CLINIC | Age: 54
End: 2019-12-31

## 2019-12-31 NOTE — TELEPHONE ENCOUNTER
Per visit notes from Care Everywhere:    Pt reports pap completed 08/16/2018, at facility outside of [facility].  Please abstract    Maureen Rodríguez CMA

## 2021-01-14 ENCOUNTER — HEALTH MAINTENANCE LETTER (OUTPATIENT)
Age: 56
End: 2021-01-14

## 2021-10-24 ENCOUNTER — HEALTH MAINTENANCE LETTER (OUTPATIENT)
Age: 56
End: 2021-10-24

## 2022-02-13 ENCOUNTER — HEALTH MAINTENANCE LETTER (OUTPATIENT)
Age: 57
End: 2022-02-13

## 2022-07-31 ENCOUNTER — HEALTH MAINTENANCE LETTER (OUTPATIENT)
Age: 57
End: 2022-07-31

## 2022-10-15 ENCOUNTER — HEALTH MAINTENANCE LETTER (OUTPATIENT)
Age: 57
End: 2022-10-15

## 2023-03-26 ENCOUNTER — HEALTH MAINTENANCE LETTER (OUTPATIENT)
Age: 58
End: 2023-03-26

## 2023-08-20 ENCOUNTER — HEALTH MAINTENANCE LETTER (OUTPATIENT)
Age: 58
End: 2023-08-20

## 2024-03-20 ENCOUNTER — APPOINTMENT (OUTPATIENT)
Dept: URBAN - METROPOLITAN AREA CLINIC 253 | Age: 59
Setting detail: DERMATOLOGY
End: 2024-03-21

## 2024-03-20 VITALS — WEIGHT: 125 LBS | RESPIRATION RATE: 14 BRPM | HEIGHT: 67 IN

## 2024-03-20 DIAGNOSIS — L71.8 OTHER ROSACEA: ICD-10-CM

## 2024-03-20 PROCEDURE — OTHER ADDITIONAL NOTES: OTHER

## 2024-03-20 PROCEDURE — OTHER MIPS QUALITY: OTHER

## 2024-03-20 PROCEDURE — OTHER COUNSELING: OTHER

## 2024-03-20 PROCEDURE — OTHER PRESCRIPTION: OTHER

## 2024-03-20 PROCEDURE — 99204 OFFICE O/P NEW MOD 45 MIN: CPT

## 2024-03-20 RX ORDER — METRONIDAZOLE 7.5 MG/G
GEL TOPICAL
Qty: 45 | Refills: 1 | Status: ERX | COMMUNITY
Start: 2024-03-20

## 2024-03-20 RX ORDER — MINOCYCLINE HYDROCHLORIDE 50 MG/1
CAPSULE ORAL
Qty: 30 | Refills: 1 | Status: ERX | COMMUNITY
Start: 2024-03-20

## 2024-03-20 RX ORDER — TACROLIMUS 1 MG/G
OINTMENT TOPICAL QD
Qty: 60 | Refills: 2 | Status: ERX | COMMUNITY
Start: 2024-03-20

## 2024-03-20 ASSESSMENT — LOCATION ZONE DERM: LOCATION ZONE: NOSE

## 2024-03-20 ASSESSMENT — LOCATION SIMPLE DESCRIPTION DERM: LOCATION SIMPLE: NOSE

## 2024-03-20 ASSESSMENT — LOCATION DETAILED DESCRIPTION DERM: LOCATION DETAILED: NASAL DORSUM

## 2024-03-20 NOTE — HPI: RASH (ROSACEA)
Is This A New Presentation, Or A Follow-Up?: Rosacea
Additional History: Her previous dermatologist said it could be inflamed hair follicles or rosacea. She has tried minocycline, triamcinalone cream, and clindamycin gel. She has not noticed any improvement. She states it is not itchy but is more sore.

## 2024-03-20 NOTE — PROCEDURE: ADDITIONAL NOTES
Render Risk Assessment In Note?: no
Detail Level: Simple
Additional Notes: Discussed continuing the minocycline as it has been effective but also adding topical treatment. \\nWe also discussed that hormonal changes, environmental changes, sun exposure, caffeine, or certain foods as potential triggers for this condition. \\nDiscussed that laser therapy would be a more aggressive option for cosmetic treatment.\\nPatient to start with minocycline 50mg QD. If she notices the condition persisting or worsening, she can contact the office and we can increase to minocycline 100mg

## 2024-04-23 ENCOUNTER — APPOINTMENT (OUTPATIENT)
Dept: URBAN - METROPOLITAN AREA CLINIC 253 | Age: 59
Setting detail: DERMATOLOGY
End: 2024-04-24

## 2024-04-23 VITALS — HEIGHT: 67 IN | RESPIRATION RATE: 14 BRPM | WEIGHT: 125 LBS

## 2024-04-23 DIAGNOSIS — L71.8 OTHER ROSACEA: ICD-10-CM

## 2024-04-23 PROCEDURE — OTHER COUNSELING: OTHER

## 2024-04-23 PROCEDURE — 99214 OFFICE O/P EST MOD 30 MIN: CPT

## 2024-04-23 PROCEDURE — OTHER PRESCRIPTION MEDICATION MANAGEMENT: OTHER

## 2024-04-23 PROCEDURE — OTHER PRESCRIPTION: OTHER

## 2024-04-23 RX ORDER — MINOCYCLINE HYDROCHLORIDE 50 MG/1
50 MG CAPSULE ORAL
Qty: 90 | Refills: 0 | Status: ERX

## 2024-04-23 RX ORDER — METRONIDAZOLE 7.5 MG/G
0.75% GEL TOPICAL BID
Qty: 45 | Refills: 2 | Status: ERX

## 2024-04-23 ASSESSMENT — LOCATION ZONE DERM: LOCATION ZONE: FACE

## 2024-04-23 ASSESSMENT — LOCATION DETAILED DESCRIPTION DERM: LOCATION DETAILED: LEFT MEDIAL MALAR CHEEK

## 2024-04-23 ASSESSMENT — LOCATION SIMPLE DESCRIPTION DERM: LOCATION SIMPLE: LEFT CHEEK

## 2024-04-23 NOTE — PROCEDURE: PRESCRIPTION MEDICATION MANAGEMENT
Continue Regimen: Metronidazole gel BID\\nMinocycline 50 mg QD
Render In Strict Bullet Format?: No
Plan: She will taper minocycline gradually as tolerated.
Detail Level: Zone
normal (ped)...

## 2024-06-25 ENCOUNTER — APPOINTMENT (OUTPATIENT)
Dept: URBAN - METROPOLITAN AREA CLINIC 253 | Age: 59
Setting detail: DERMATOLOGY
End: 2024-06-25

## 2024-06-25 VITALS — RESPIRATION RATE: 14 BRPM | HEIGHT: 67 IN | WEIGHT: 125 LBS

## 2024-06-25 DIAGNOSIS — L71.8 OTHER ROSACEA: ICD-10-CM

## 2024-06-25 PROCEDURE — OTHER COUNSELING: OTHER

## 2024-06-25 PROCEDURE — OTHER MIPS QUALITY: OTHER

## 2024-06-25 PROCEDURE — OTHER PRESCRIPTION: OTHER

## 2024-06-25 PROCEDURE — OTHER PRESCRIPTION MEDICATION MANAGEMENT: OTHER

## 2024-06-25 PROCEDURE — 99214 OFFICE O/P EST MOD 30 MIN: CPT

## 2024-06-25 RX ORDER — MINOCYCLINE HYDROCHLORIDE 50 MG/1
50 CAPSULE ORAL QD
Qty: 30 | Refills: 0 | Status: ERX

## 2024-06-25 RX ORDER — METRONIDAZOLE 7.5 MG/G
0.75% CREAM TOPICAL BID
Qty: 45 | Refills: 2 | Status: ERX | COMMUNITY
Start: 2024-06-25

## 2024-06-25 ASSESSMENT — LOCATION ZONE DERM: LOCATION ZONE: FACE

## 2024-06-25 ASSESSMENT — LOCATION SIMPLE DESCRIPTION DERM: LOCATION SIMPLE: LEFT CHEEK

## 2024-06-25 ASSESSMENT — LOCATION DETAILED DESCRIPTION DERM: LOCATION DETAILED: LEFT INFERIOR CENTRAL MALAR CHEEK

## 2024-06-25 NOTE — PROCEDURE: PRESCRIPTION MEDICATION MANAGEMENT
Modify Regimen: Taper off of the Minocycline. QD d/c to discontinue over the next couple of weeks. If flaring occurs, RTC.
Detail Level: Zone
Render In Strict Bullet Format?: No
Continue Regimen: Metronidazole

## 2024-06-25 NOTE — PROCEDURE: COUNSELING
Patient Specific Counseling (Will Not Stick From Patient To Patient): She overall notes improvement. \\nRecommended that she titrate down the minocycline. She states she already is taking minocycline 50mg qod. Recommended she continue to titrate down to the goal being she discontinued it. \\nRecommended she continue with the metronidazole cream. Informed her that I will send in the minocycline for flares.
Detail Level: Zone

## 2025-05-06 ENCOUNTER — APPOINTMENT (OUTPATIENT)
Dept: URBAN - METROPOLITAN AREA CLINIC 253 | Age: 60
Setting detail: DERMATOLOGY
End: 2025-05-06

## 2025-05-06 VITALS — WEIGHT: 120 LBS | HEIGHT: 67 IN | RESPIRATION RATE: 14 BRPM

## 2025-05-06 DIAGNOSIS — L71.8 OTHER ROSACEA: ICD-10-CM

## 2025-05-06 PROCEDURE — 99214 OFFICE O/P EST MOD 30 MIN: CPT

## 2025-05-06 PROCEDURE — OTHER PRESCRIPTION: OTHER

## 2025-05-06 PROCEDURE — OTHER PRESCRIPTION MEDICATION MANAGEMENT: OTHER

## 2025-05-06 PROCEDURE — OTHER COUNSELING: OTHER

## 2025-05-06 RX ORDER — MINOCYCLINE HYDROCHLORIDE 50 MG/1
50 CAPSULE ORAL QD
Qty: 90 | Refills: 0 | Status: ERX

## 2025-05-06 ASSESSMENT — LOCATION ZONE DERM: LOCATION ZONE: FACE

## 2025-05-06 ASSESSMENT — LOCATION SIMPLE DESCRIPTION DERM: LOCATION SIMPLE: LEFT CHEEK

## 2025-05-06 ASSESSMENT — LOCATION DETAILED DESCRIPTION DERM: LOCATION DETAILED: LEFT INFERIOR CENTRAL MALAR CHEEK

## (undated) DEVICE — SOL WATER IRRIG 1000ML BOTTLE 2F7114